# Patient Record
Sex: MALE | Race: WHITE | NOT HISPANIC OR LATINO | Employment: FULL TIME | URBAN - METROPOLITAN AREA
[De-identification: names, ages, dates, MRNs, and addresses within clinical notes are randomized per-mention and may not be internally consistent; named-entity substitution may affect disease eponyms.]

---

## 2017-01-26 ENCOUNTER — TELEPHONE (OUTPATIENT)
Dept: UROLOGY | Facility: CLINIC | Age: 57
End: 2017-01-26

## 2017-01-26 DIAGNOSIS — N45.1 EPIDIDYMITIS: Primary | ICD-10-CM

## 2017-01-26 RX ORDER — CIPROFLOXACIN 500 MG/1
500 TABLET, FILM COATED ORAL 2 TIMES DAILY
Qty: 60 TABLET | Refills: 0 | Status: SHIPPED | OUTPATIENT
Start: 2017-01-26 | End: 2017-03-08

## 2017-03-08 ENCOUNTER — OFFICE VISIT (OUTPATIENT)
Dept: PEDIATRICS | Facility: CLINIC | Age: 57
End: 2017-03-08
Payer: COMMERCIAL

## 2017-03-08 ENCOUNTER — RADIANT APPOINTMENT (OUTPATIENT)
Dept: GENERAL RADIOLOGY | Facility: CLINIC | Age: 57
End: 2017-03-08
Attending: PHYSICIAN ASSISTANT
Payer: COMMERCIAL

## 2017-03-08 VITALS
DIASTOLIC BLOOD PRESSURE: 60 MMHG | HEIGHT: 65 IN | WEIGHT: 149.5 LBS | SYSTOLIC BLOOD PRESSURE: 110 MMHG | HEART RATE: 111 BPM | BODY MASS INDEX: 24.91 KG/M2 | OXYGEN SATURATION: 100 % | TEMPERATURE: 98.9 F

## 2017-03-08 DIAGNOSIS — J20.9 ACUTE BRONCHITIS, UNSPECIFIED ORGANISM: Primary | ICD-10-CM

## 2017-03-08 DIAGNOSIS — R05.9 COUGH: ICD-10-CM

## 2017-03-08 PROCEDURE — 99214 OFFICE O/P EST MOD 30 MIN: CPT | Performed by: PHYSICIAN ASSISTANT

## 2017-03-08 PROCEDURE — 71020 XR CHEST 2 VW: CPT

## 2017-03-08 RX ORDER — AZITHROMYCIN 250 MG/1
TABLET, FILM COATED ORAL
Qty: 6 TABLET | Refills: 0 | Status: SHIPPED | OUTPATIENT
Start: 2017-03-08 | End: 2022-02-16

## 2017-03-08 NOTE — MR AVS SNAPSHOT
After Visit Summary   3/8/2017    Joshua Henderson    MRN: [id number]           Patient Information     Date Of Birth          1960        Visit Information        Provider Department      3/8/2017 9:50 AM Breanna Deleon PA-C [facility]        Today's Diagnoses     Cough    -  1    Acute bronchitis, unspecified organism          Care Instructions                       Acute Bronchitis                  What is acute bronchitis?   Bronchitis is an infection of the air passages--that is, the tubes that connect the windpipe to the lungs. It causes swelling and irritation of the airways. With acute bronchitis you usually have a cough that produces phlegm and pain behind the breastbone when you breathe deeply or cough.   How does it occur?   Bronchitis often occurs with viral infections of the respiratory tract, such as colds and flu. Bronchitis may also be caused by bacterial infections. It may occur with childhood illnesses such as measles and whooping cough.   Attacks are most frequent during the winter or when the level of air pollution is high.   Infants, young children, older adults, smokers, and people with heart disease or lung disease (including asthma and allergies) are most likely to get acute bronchitis.   What are the symptoms?   Symptoms may include:   a deep cough that produces yellowish or greenish phlegm   pain behind the breastbone when you breathe deeply or cough   wheezing   feeling short of breath   fever   chills   headache   sore muscles.   How is it diagnosed?   Your healthcare provider will ask about your symptoms and examine you. You may have tests, such as:   a test of phlegm to look for bacteria   chest X-ray   blood tests.   How is it treated?   Acute bronchitis often does not require medical treatment. Resting at home and drinking plenty of fluids to keep the mucus loose may be all you need to do to get better in a few days. If your symptoms are  severe or you have other health problems (such as heart or lung disease or diabetes), you may need to take antibiotics.   How long will the effects last?   Most of the time acute bronchitis clears up in a few days. Your cough may slowly get better in 1 to 2 weeks.   It may take you longer to recover if:   You are a smoker.   You live in an area where air pollution is a problem.   You have a heart or lung disease.   You have any other continuing health problems.   How can I take care of myself?   You can help yourself by:   following the full treatment your healthcare provider recommends   using a vaporizer, humidifier, or steam from hot water to add moisture to the air   drinking plenty of liquids   taking cough medicine if recommended by your healthcare provider   resting in bed   taking aspirin or acetaminophen to reduce fever and relieve headache and muscle pain (Check with your healthcare provider before you give any medicine that contains aspirin or salicylates to a child or teen. This includes medicines like baby aspirin, some cold medicines, and Pepto Bismol. Children and teens who take aspirin are at risk for a serious illness called Reye's syndrome.)   eating healthy meals.   Call your healthcare provider if:   You have trouble breathing.   You have a fever of 101.5?F (38.6?C) or higher.   You cough up blood.   Your symptoms are getting worse instead of better.   You don't start to feel better after 3 days of treatment.   You have any symptoms that concern you.   How can I help prevent acute bronchitis?   To reduce your risk of getting a respiratory infection:   Do not smoke.   Wash your hands often, especially when you are around people with colds (upper respiratory infections).   If you have asthma or allergies, keep your symptoms under good control.   Get regular exercise.   Eat healthy foods.       Published by [employer].  This content is reviewed periodically and is subject to change as new health  "information becomes available. The information is intended to inform and educate and is not a replacement for medical evaluation, advice, diagnosis or treatment by a healthcare professional.   Developed by [employer].   ? 2010 [employer] [facility] [facility] and/or its affiliates. All Rights Reserved.   Copyright   [employer]                 Follow-ups after your visit        Who to contact     If you have questions or need follow up information about today's clinic visit or your schedule please contact [facility] [place] directly at [phone].  Normal or non-critical lab and imaging results will be communicated to you by MyChart, letter or phone within 4 business days after the clinic has received the results. If you do not hear from us within 7 days, please contact the clinic through [employer] or phone. If you have a critical or abnormal lab result, we will notify you by phone as soon as possible.  Submit refill requests through PaymentOne or call your pharmacy and they will forward the refill request to us. Please allow 3 business days for your refill to be completed.          Additional Information About Your Visit        PaymentOne Information     PaymentOne lets you send messages to your doctor, view your test results, renew your prescriptions, schedule appointments and more. To sign up, go to [email or web address] . Click on \"Log in\" on the left side of the screen, which will take you to the Welcome page. Then click on \"Sign up Now\" on the right side of the page.     You will be asked to enter the access code listed below, as well as some personal information. Please follow the directions to create your username and password.     Your access code is: [email or web address]  Expires: 2017 10:35 AM     Your access code will  in 90 days. If you need help or a new code, please call your [place] clinic or [phone].        Care EveryWhere ID     This is your Care EveryWhere ID. This could be used by other " "organizations to access your [place] medical records  [id number]        Your Vitals Were     Pulse Temperature Height Pulse Oximetry BMI (Body Mass Index)       111 98.9  F (37.2  C) (Oral) 5' 5\" (1.651 m) 100% 24.88 kg/m2        Blood Pressure from Last 3 Encounters:   03/08/17 110/60   09/09/16 128/78   03/03/16 122/70    Weight from Last 3 Encounters:   03/08/17 149 lb 8 oz (67.8 kg)   09/09/16 166 lb 3.2 oz (75.4 kg)   03/03/16 164 lb (74.4 kg)                 Today's Medication Changes          These changes are accurate as of: 3/8/17 10:35 AM.  If you have any questions, ask your nurse or doctor.               Start taking these medicines.        Dose/Directions    azithromycin 250 MG tablet   Commonly known as:  ZITHROMAX   Used for:  Acute bronchitis, unspecified organism   Started by:  Breanna Deleon PA-C        Two tablets first day, then one tablet daily for four days.   Quantity:  6 tablet   Refills:  0         Stop taking these medicines if you haven't already. Please contact your care team if you have questions.     ciprofloxacin 500 MG tablet   Commonly known as:  CIPRO   Stopped by:  Breanna Deleon PA-C           ondansetron 4 MG tablet   Commonly known as:  ZOFRAN   Stopped by:  Breanna Deleon PA-C           PRILOSEC PO   Stopped by:  Breanna Deleon PA-C                Where to get your medicines      These medications were sent to [facility] [id number] - [place], [place] - [address]  [address] [place] [address]     Phone:  [phone]     azithromycin 250 MG tablet                Primary Care Provider Office Phone # Fax #    Paul Leblanc [phone]       [address] [place] [address]        Thank you!     Thank you for choosing [facility]  for your care. Our goal is always to provide you with excellent care. Hearing back from our patients is one way we can " continue to improve our services. Please take a few minutes to complete the written survey that you may receive in the mail after your visit with us. Thank you!             Your Updated Medication List - Protect others around you: Learn how to safely use, store and throw away your medicines at www.disposemymeds.org.          This list is accurate as of: 3/8/17 10:35 AM.  Always use your most recent med list.                   Brand Name Dispense Instructions for use    azithromycin 250 MG tablet    ZITHROMAX    6 tablet    Two tablets first day, then one tablet daily for four days.       calcium carbonate 500 MG tablet    OS-RICHMOND 500 mg [place]. [place]     Take 500 mg by mouth 2 times daily       multivitamin, therapeutic with minerals Tabs tablet      Take 1 tablet by mouth daily       ZANTAC PO      Take 150 mg by mouth 2 times daily

## 2017-03-08 NOTE — PATIENT INSTRUCTIONS
Acute Bronchitis                  What is acute bronchitis?   Bronchitis is an infection of the air passages--that is, the tubes that connect the windpipe to the lungs. It causes swelling and irritation of the airways. With acute bronchitis you usually have a cough that produces phlegm and pain behind the breastbone when you breathe deeply or cough.   How does it occur?   Bronchitis often occurs with viral infections of the respiratory tract, such as colds and flu. Bronchitis may also be caused by bacterial infections. It may occur with childhood illnesses such as measles and whooping cough.   Attacks are most frequent during the winter or when the level of air pollution is high.   Infants, young children, older adults, smokers, and people with heart disease or lung disease (including asthma and allergies) are most likely to get acute bronchitis.   What are the symptoms?   Symptoms may include:   a deep cough that produces yellowish or greenish phlegm   pain behind the breastbone when you breathe deeply or cough   wheezing   feeling short of breath   fever   chills   headache   sore muscles.   How is it diagnosed?   Your healthcare provider will ask about your symptoms and examine you. You may have tests, such as:   a test of phlegm to look for bacteria   chest X-ray   blood tests.   How is it treated?   Acute bronchitis often does not require medical treatment. Resting at home and drinking plenty of fluids to keep the mucus loose may be all you need to do to get better in a few days. If your symptoms are severe or you have other health problems (such as heart or lung disease or diabetes), you may need to take antibiotics.   How long will the effects last?   Most of the time acute bronchitis clears up in a few days. Your cough may slowly get better in 1 to 2 weeks.   It may take you longer to recover if:   You are a smoker.   You live in an area where air pollution is a problem.   You have a heart  or lung disease.   You have any other continuing health problems.   How can I take care of myself?   You can help yourself by:   following the full treatment your healthcare provider recommends   using a vaporizer, humidifier, or steam from hot water to add moisture to the air   drinking plenty of liquids   taking cough medicine if recommended by your healthcare provider   resting in bed   taking aspirin or acetaminophen to reduce fever and relieve headache and muscle pain (Check with your healthcare provider before you give any medicine that contains aspirin or salicylates to a child or teen. This includes medicines like baby aspirin, some cold medicines, and Pepto Bismol. Children and teens who take aspirin are at risk for a serious illness called Reye's syndrome.)   eating healthy meals.   Call your healthcare provider if:   You have trouble breathing.   You have a fever of 101.5?F (38.6?C) or higher.   You cough up blood.   Your symptoms are getting worse instead of better.   You don't start to feel better after 3 days of treatment.   You have any symptoms that concern you.   How can I help prevent acute bronchitis?   To reduce your risk of getting a respiratory infection:   Do not smoke.   Wash your hands often, especially when you are around people with colds (upper respiratory infections).   If you have asthma or allergies, keep your symptoms under good control.   Get regular exercise.   Eat healthy foods.       Published by [employer].  This content is reviewed periodically and is subject to change as new health information becomes available. The information is intended to inform and educate and is not a replacement for medical evaluation, advice, diagnosis or treatment by a healthcare professional.   Developed by [employer].   ? 2010 [employer] and/or its affiliates. All Rights Reserved.   Copyright   Clinical Reference Systems 2011

## 2017-03-08 NOTE — PROGRESS NOTES
"  SUBJECTIVE:                                                    Joshua Henderson is a 56 year old male who presents to clinic today for the following health issues:    Acute Illness   Acute illness concerns: cold sx  Onset: 5 days ago    Fever: YES- 99.5--initially    Chills/Sweats: no    Headache (location?): YES- the first day    Sinus Pressure:no    Conjunctivitis:  no    Ear Pain: no    Rhinorrhea: YES    Congestion: YES    Sore Throat: no     Cough: yes, coughing up yellow phlegm    Wheeze: no    No sob    Able to sleep at night    Decreased Appetite: no    Nausea: no    Vomiting: no    Diarrhea:  no    Dysuria/Freq.: no    Fatigue/Achiness: YES- fatigue    Sick/Strep Exposure: no     Therapies Tried and outcome: mucinex, tylenol    Office setting for work.   Nonsmoker.   No history of asthma, bronchitis. History of pneumonia.    ROS:  ROS otherwise negative    OBJECTIVE:                                                    /60 (BP Location: Right arm, Patient Position: Chair, Cuff Size: Adult Regular)  Pulse 111  Temp 98.9  F (37.2  C) (Oral)  Ht 5' 5\" (1.651 m)  Wt 149 lb 8 oz (67.8 kg)  SpO2 100%  BMI 24.88 kg/m2  Body mass index is 24.88 kg/(m^2).   GENERAL:  alert, no distress  HENT: ear canals- normal; TMs- normal; Nose- normal; Mouth- no ulcers, no lesions  NECK: no tenderness, no adenopathy  RESP: diminished breath sounds throughout; rhonchi present; no wheezing  CV: regular rates and rhythm, normal S1 S2, no S3 or S4 and no murmur, no click or rub    Diagnostic test results:  CXR appears NEGATIVE. Radiology review pending.  No results found for this or any previous visit (from the past 24 hour(s)).     ASSESSMENT/PLAN:                                                    (J20.9) Acute bronchitis, unspecified organism  (primary encounter diagnosis)  Comment: patient likely had influenza. Symptoms worsened last night. Proceed with treatment for bacterial bronchitis.   Plan: XR Chest 2 Views, " azithromycin (ZITHROMAX) 250         MG tablet          See Patient Instructions    Breanna Deleon PA-C  [facility]

## 2017-03-08 NOTE — NURSING NOTE
"Chief Complaint   Patient presents with     URI       Initial /60 (BP Location: Right arm, Patient Position: Chair, Cuff Size: Adult Regular)  Pulse 111  Temp 98.9  F (37.2  C) (Oral)  Ht 5' 5\" (1.651 m)  Wt 149 lb 8 oz (67.8 kg)  SpO2 100%  BMI 24.88 kg/m2 Estimated body mass index is 24.88 kg/(m^2) as calculated from the following:    Height as of this encounter: 5' 5\" (1.651 m).    Weight as of this encounter: 149 lb 8 oz (67.8 kg).  Medication Reconciliation: complete  "

## 2017-11-26 ENCOUNTER — TRANSFERRED RECORDS (OUTPATIENT)
Dept: HEALTH INFORMATION MANAGEMENT | Facility: CLINIC | Age: 57
End: 2017-11-26

## 2020-03-16 DIAGNOSIS — N45.1 EPIDIDYMITIS: Primary | ICD-10-CM

## 2020-03-17 ENCOUNTER — OFFICE VISIT (OUTPATIENT)
Dept: UROLOGY | Facility: CLINIC | Age: 60
End: 2020-03-17
Payer: COMMERCIAL

## 2020-03-17 VITALS
DIASTOLIC BLOOD PRESSURE: 88 MMHG | HEART RATE: 95 BPM | WEIGHT: 157 LBS | SYSTOLIC BLOOD PRESSURE: 142 MMHG | BODY MASS INDEX: 25.23 KG/M2 | HEIGHT: 66 IN | OXYGEN SATURATION: 97 %

## 2020-03-17 DIAGNOSIS — N45.1 EPIDIDYMITIS: ICD-10-CM

## 2020-03-17 LAB
ALBUMIN UR-MCNC: NEGATIVE MG/DL
APPEARANCE UR: CLEAR
BILIRUB UR QL STRIP: NEGATIVE
COLOR UR AUTO: YELLOW
GLUCOSE UR STRIP-MCNC: NEGATIVE MG/DL
HGB UR QL STRIP: ABNORMAL
KETONES UR STRIP-MCNC: NEGATIVE MG/DL
LEUKOCYTE ESTERASE UR QL STRIP: NEGATIVE
NITRATE UR QL: NEGATIVE
PH UR STRIP: 7 PH (ref 5–7)
SOURCE: ABNORMAL
SP GR UR STRIP: >1.03 (ref 1–1.03)
UROBILINOGEN UR STRIP-ACNC: 0.2 EU/DL (ref 0.2–1)

## 2020-03-17 PROCEDURE — 81003 URINALYSIS AUTO W/O SCOPE: CPT | Performed by: UROLOGY

## 2020-03-17 PROCEDURE — 99204 OFFICE O/P NEW MOD 45 MIN: CPT | Performed by: UROLOGY

## 2020-03-17 RX ORDER — DOXYCYCLINE 100 MG/1
100 TABLET ORAL 2 TIMES DAILY
Qty: 84 TABLET | Refills: 0 | Status: SHIPPED | OUTPATIENT
Start: 2020-03-17 | End: 2020-04-28

## 2020-03-17 ASSESSMENT — MIFFLIN-ST. JEOR: SCORE: 1469.9

## 2020-03-17 ASSESSMENT — PAIN SCALES - GENERAL: PAINLEVEL: MILD PAIN (3)

## 2020-03-17 NOTE — PROGRESS NOTES
History: It is a great pleasure to see this very pleasant 59-year-old gentleman in initial consultation today.  He has a long history of intermittent episodes of epididymitis currently he is noticing renewed pain on the right side with some mild swelling but there is no history of fevers or other significant major symptoms.  The urinary symptoms seem normal without evidence of dysuria, hematuria or history of urinary tract infection.  The pain does not seem to radiate into the abdomen significantly.  His general health is otherwise stable.  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  Other conditions include a history of scoliosis forward  There were no other major complaints at this time    Past Medical History:   Diagnosis Date     Annular pancreas      Congenital aortic stenosis     mild     Epididymitis, bilateral      Scoliosis     kyphosis, prior bracing        Past Surgical History:   Procedure Laterality Date     CARDIAC CATH ARTERIAL      age 8, dx bicuspid aortic valve     SURGICAL HISTORY OF -       excision annular pancreas lesion age 3days       Family History   Problem Relation Age of Onset     Cancer Father          55 of metastatic melanoma     Hypertension Father      Cardiovascular Mother         bradycardia, pacemaker     Hypertension Mother      Diabetes Brother      Cancer - colorectal No family hx of      Prostate Cancer No family hx of        Social History     Socioeconomic History     Marital status:      Spouse name: Not on file     Number of children: Not on file     Years of education: Not on file     Highest education level: Not on file   Occupational History     Not on file   Social Needs     Financial resource strain: Not on file     Food insecurity     Worry: Not on file     Inability: Not on file      Transportation needs     Medical: Not on file     Non-medical: Not on file   Tobacco Use     Smoking status: Never Smoker     Smokeless tobacco: Never Used   Substance and Sexual Activity     Alcohol use: No     Drug use: No     Sexual activity: Yes     Partners: Female   Lifestyle     Physical activity     Days per week: Not on file     Minutes per session: Not on file     Stress: Not on file   Relationships     Social connections     Talks on phone: Not on file     Gets together: Not on file     Attends Scientologist service: Not on file     Active member of club or organization: Not on file     Attends meetings of clubs or organizations: Not on file     Relationship status: Not on file     Intimate partner violence     Fear of current or ex partner: Not on file     Emotionally abused: Not on file     Physically abused: Not on file     Forced sexual activity: Not on file   Other Topics Concern     Parent/sibling w/ CABG, MI or angioplasty before 65F 55M? Not Asked   Social History Narrative     Not on file       Current Outpatient Medications   Medication Sig Dispense Refill     calcium carbonate (OS-RICHMOND 500 MG [place]. [place]) 500 MG tablet Take 500 mg by mouth 2 times daily       doxycycline monohydrate (ADOXA) 100 MG tablet Take 1 tablet (100 mg) by mouth 2 times daily 84 tablet 0     multivitamin, therapeutic with minerals (THERA-VIT-M) TABS Take 1 tablet by mouth daily       azithromycin (ZITHROMAX) 250 MG tablet Two tablets first day, then one tablet daily for four days. (Patient not taking: Reported on 3/17/2020) 6 tablet 0     RaNITidine HCl (ZANTAC PO) Take 150 mg by mouth 2 times daily         Review Of Systems:  Skin: negative  Eyes: negative  Ears/Nose/Throat: negative  Respiratory: Normal  Cardiovascular: History of congenital aortic stenosis  Gastrointestinal: negative  Genitourinary: History of annular pancreas   Musculoskeletal: Scoliosis  Neurologic: negative  Psychiatric:  "negative  Hematologic/Lymphatic/Immunologic: negative  Endocrine: negative    Exam:  BP (!) 142/88   Pulse 95   Ht 1.676 m (5' 6\")   Wt 71.2 kg (157 lb)   SpO2 97%   BMI 25.34 kg/m      General Impression: Very pleasant gentleman in no acute distress, well oriented to time place and person.    Mental status.  Normal    HEENT: There is no clinical evidence of jaundice on examination conjunctiva.  Extraocular eye movements are normal.  Mucous membranes unremarkable    Skin: Skin is normal to examination    Lymph Nodes: There is no inguinal lymphadenopathy    Respiratory System: The respiratory cycle is normal    Cardiovascular: No significant pitting peripheral edema    Abdominal: The abdomen shows a well-healed surgical scar on the right side of the abdomen.  There is no evidence of inguinal hernia.  There are no palpable masses    Extremities: Extremities otherwise unremarkable    Back and Flank: There is some curvature of the spine consistent with scoliosis but no other remarkable features    Genital: Penis is circumcised with normal size meatus.  Both testes are descended.  The epididymis on both side is significantly tender and swollen although not acutely inflamed.  The testicle itself is of normal size on each side and only mildly tender.      Rectal: Not examined    Neurologic: There are no focal abnormal clinical neurological signs in the central, or peripheral nervous systems    Impression: The patient has evidence of bilateral chronic epididymitis with tenderness of both epididymis knees although many of his symptoms are at the present time of being on the right side.  He had previously been treated with ciprofloxacin in the past sometime ago.  Urinalysis today is quite negative.  I discussed the situation with the patient in detail today.  He does have mild to moderate chronic epididymitis with a significant recent exacerbation and now has both epididymis is quite tender.  The swelling however is only " "moderate.  I would recommend we approach this in the following way.  1.  I want him to eliminate caffeine for the time being to see how helpful this would be.  2.  He should avoid cranberry juice.  3.  He should try and avoid really spicy food if this is causes significant problems in this area.  4.  I like him to wear good supporting underpants rather than boxer shorts.  5.  He should consider taking warm baths in the evening which may be quite helpful.  6.  He should take anti-inflammatory medication as needed.  7.  I like to put him on doxycycline under the grams twice a day for the next 6 weeks.  I have explained to him that this is a situation where relapses and remissions are not unusual I am hoping that the current measures will help this considerably to alleviate.  I have asked him to come and see me again should this does not solve the problem however.  I did carefully discussed the entire situation with the patient in detail today.  Answered all his questions    Plan: I will see him on a as needed basis and immediately if symptoms are not resolving in a timely manner    \"This dictation was performed with voice recognition software and may contain errors,  omissions and inadvertent word substitution.\"    "

## 2020-03-17 NOTE — LETTER
3/17/2020       RE: Joshua Henderson   [facility] [address]     Dear Colleague,    Thank you for referring your patient, Joshua Henderson, to the [facility] [place] at [facility]. Please see a copy of my visit note below.    History: It is a great pleasure to see this very pleasant 59-year-old gentleman in initial consultation today.  He has a long history of intermittent episodes of epididymitis currently he is noticing renewed pain on the right side with some mild swelling but there is no history of fevers or other significant major symptoms.  The urinary symptoms seem normal without evidence of dysuria, hematuria or history of urinary tract infection.  The pain does not seem to radiate into the abdomen significantly.  His general health is otherwise stable.  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  Other conditions include a history of scoliosis forward  There were no other major complaints at this time    Past Medical History:   Diagnosis Date     Annular pancreas      Congenital aortic stenosis     mild     Epididymitis, bilateral      Scoliosis     kyphosis, prior bracing        Past Surgical History:   Procedure Laterality Date     CARDIAC CATH ARTERIAL      age 8, dx bicuspid aortic valve     SURGICAL HISTORY OF -       excision annular pancreas lesion age 3days       Family History   Problem Relation Age of Onset     Cancer Father          55 of metastatic melanoma     Hypertension Father      Cardiovascular Mother         bradycardia, pacemaker     Hypertension Mother      Diabetes Brother      Cancer - colorectal No family hx of      Prostate Cancer No family hx of        Social History     Socioeconomic History     Marital status:       Spouse name: Not on file     Number of children: Not on file     Years of education: Not on file     Highest education level: Not on file   Occupational History     Not on file   Social Needs     Financial resource strain: Not on file     Food insecurity     Worry: Not on file     Inability: Not on file     Transportation needs     Medical: Not on file     Non-medical: Not on file   Tobacco Use     Smoking status: Never Smoker     Smokeless tobacco: Never Used   Substance and Sexual Activity     Alcohol use: No     Drug use: No     Sexual activity: Yes     Partners: Female   Lifestyle     Physical activity     Days per week: Not on file     Minutes per session: Not on file     Stress: Not on file   Relationships     Social connections     Talks on phone: Not on file     Gets together: Not on file     Attends Mu-ism service: Not on file     Active member of club or organization: Not on file     Attends meetings of clubs or organizations: Not on file     Relationship status: Not on file     Intimate partner violence     Fear of current or ex partner: Not on file     Emotionally abused: Not on file     Physically abused: Not on file     Forced sexual activity: Not on file   Other Topics Concern     Parent/sibling w/ CABG, MI or angioplasty before 65F 55M? Not Asked   Social History Narrative     Not on file       Current Outpatient Medications   Medication Sig Dispense Refill     calcium carbonate (OS-RICHMOND 500 MG [place]. CA) 500 MG tablet Take 500 mg by mouth 2 times daily       doxycycline monohydrate (ADOXA) 100 MG tablet Take 1 tablet (100 mg) by mouth 2 times daily 84 tablet 0     multivitamin, therapeutic with minerals (THERA-VIT-M) TABS Take 1 tablet by mouth daily       azithromycin (ZITHROMAX) 250 MG tablet Two tablets first day, then one tablet daily for four days. (Patient not taking: Reported on 3/17/2020) 6 tablet 0     RaNITidine HCl (ZANTAC PO) Take 150 mg by mouth 2 times daily         Review  "Of Systems:  Skin: negative  Eyes: negative  Ears/Nose/Throat: negative  Respiratory: Normal  Cardiovascular: History of congenital aortic stenosis  Gastrointestinal: negative  Genitourinary: History of annular pancreas   Musculoskeletal: Scoliosis  Neurologic: negative  Psychiatric: negative  Hematologic/Lymphatic/Immunologic: negative  Endocrine: negative    Exam:  BP (!) 142/88   Pulse 95   Ht 1.676 m (5' 6\")   Wt 71.2 kg (157 lb)   SpO2 97%   BMI 25.34 kg/m      General Impression: Very pleasant gentleman in no acute distress, well oriented to time place and person.    Mental status.  Normal    HEENT: There is no clinical evidence of jaundice on examination conjunctiva.  Extraocular eye movements are normal.  Mucous membranes unremarkable    Skin: Skin is normal to examination    Lymph Nodes: There is no inguinal lymphadenopathy    Respiratory System: The respiratory cycle is normal    Cardiovascular: No significant pitting peripheral edema    Abdominal: The abdomen shows a well-healed surgical scar on the right side of the abdomen.  There is no evidence of inguinal hernia.  There are no palpable masses    Extremities: Extremities otherwise unremarkable    Back and Flank: There is some curvature of the spine consistent with scoliosis but no other remarkable features    Genital: Penis is circumcised with normal size meatus.  Both testes are descended.  The epididymis on both side is significantly tender and swollen although not acutely inflamed.  The testicle itself is of normal size on each side and only mildly tender.      Rectal: Not examined    Neurologic: There are no focal abnormal clinical neurological signs in the central, or peripheral nervous systems    Impression: The patient has evidence of bilateral chronic epididymitis with tenderness of both epididymis knees although many of his symptoms are at the present time of being on the right side.  He had previously been treated with ciprofloxacin in " "the past sometime ago.  Urinalysis today is quite negative.  I discussed the situation with the patient in detail today.  He does have mild to moderate chronic epididymitis with a significant recent exacerbation and now has both epididymis is quite tender.  The swelling however is only moderate.  I would recommend we approach this in the following way.  1.  I want him to eliminate caffeine for the time being to see how helpful this would be.  2.  He should avoid cranberry juice.  3.  He should try and avoid really spicy food if this is causes significant problems in this area.  4.  I like him to wear good supporting underpants rather than boxer shorts.  5.  He should consider taking warm baths in the evening which may be quite helpful.  6.  He should take anti-inflammatory medication as needed.  7.  I like to put him on doxycycline under the grams twice a day for the next 6 weeks.  I have explained to him that this is a situation where relapses and remissions are not unusual I am hoping that the current measures will help this considerably to alleviate.  I have asked him to come and see me again should this does not solve the problem however.  I did carefully discussed the entire situation with the patient in detail today.  Answered all his questions    Plan: I will see him on a as needed basis and immediately if symptoms are not resolving in a timely manner    \"This dictation was performed with voice recognition software and may contain errors,  omissions and inadvertent word substitution.\"      Again, thank you for allowing me to participate in the care of your patient.      Sincerely,    Chin Everett MD      "

## 2020-03-17 NOTE — NURSING NOTE
Chief Complaint   Patient presents with     Other     Pt here for epididymitis     Sandra Byrd, CMA

## 2020-12-23 ENCOUNTER — TELEPHONE (OUTPATIENT)
Dept: UROLOGY | Facility: CLINIC | Age: 60
End: 2020-12-23

## 2020-12-23 DIAGNOSIS — N45.1 EPIDIDYMITIS: Primary | ICD-10-CM

## 2020-12-23 RX ORDER — DOXYCYCLINE 100 MG/1
100 TABLET ORAL 2 TIMES DAILY
Qty: 60 TABLET | Refills: 0 | Status: SHIPPED | OUTPATIENT
Start: 2020-12-23 | End: 2021-01-22

## 2020-12-23 NOTE — TELEPHONE ENCOUNTER
[facility] Call Center    Phone Message    May a detailed message be left on voicemail: yes     Reason for Call: Medication Refill Request    Has the patient contacted the pharmacy for the refill? Yes   Name of medication being requested: doxycycline monohydrate (ADOXA) 100 MG tablet  Pt Joshua reports flare-up of epididymitis.  Provider who prescribed the medication: Dr Everett  Pharmacy: [id number] [address] [place] [facility] [address]   Date medication is needed: 12/23/2020      Action Taken: Message routed to:  Other:  urology    Travel Screening: Not Applicable

## 2020-12-23 NOTE — TELEPHONE ENCOUNTER
[facility] Call [facility]    Phone Message    May a detailed message be left on voicemail: yes     Reason for Call: Other: Merritt calling to report that the pharmacy has not received the Rx as of yet.  He was informed that the pharmacy confirmed reciept of it at 10:16am.  He is requesting that the prescription be resent.  Please call him with any questions or concern.     Action Taken: Message routed to:  [facility]):  Urology    Travel Screening: Not Applicable

## 2020-12-23 NOTE — TELEPHONE ENCOUNTER
"Per MD-\"Suggest we start doxycycline 100 mg twice a day for 4 weeks.\"  RX Called into patient's preferred pharmacy. Informed patient.     Nadine Milligan LPN    "

## 2020-12-24 NOTE — TELEPHONE ENCOUNTER
[facility] Call [facility]    Phone Message    May a detailed message be left on voicemail: no     Reason for Call: Other: Pt calling again about status of resending script to pharmacy. Pt would like someone to call the pharmacy to confirm receipt of delivery.     Action Taken: Message routed to:  Other:  CLINICAL POOL    Travel Screening: Not Applicable

## 2021-03-11 ENCOUNTER — IMMUNIZATION (OUTPATIENT)
Dept: PEDIATRICS | Facility: CLINIC | Age: 61
End: 2021-03-11
Payer: COMMERCIAL

## 2021-03-11 PROCEDURE — 91300 PR COVID VAC PFIZER DIL RECON 30 MCG/0.3 ML IM: CPT

## 2021-03-11 PROCEDURE — 0001A PR COVID VAC PFIZER DIL RECON 30 MCG/0.3 ML IM: CPT

## 2021-03-14 ENCOUNTER — HEALTH MAINTENANCE LETTER (OUTPATIENT)
Age: 61
End: 2021-03-14

## 2021-04-01 ENCOUNTER — IMMUNIZATION (OUTPATIENT)
Dept: PEDIATRICS | Facility: CLINIC | Age: 61
End: 2021-04-01
Attending: INTERNAL MEDICINE
Payer: COMMERCIAL

## 2021-04-01 PROCEDURE — 0002A PR COVID VAC PFIZER DIL RECON 30 MCG/0.3 ML IM: CPT

## 2021-04-01 PROCEDURE — 91300 PR COVID VAC PFIZER DIL RECON 30 MCG/0.3 ML IM: CPT

## 2021-10-24 ENCOUNTER — HEALTH MAINTENANCE LETTER (OUTPATIENT)
Age: 61
End: 2021-10-24

## 2022-02-16 ENCOUNTER — OFFICE VISIT (OUTPATIENT)
Dept: CARDIOLOGY | Facility: CLINIC | Age: 62
End: 2022-02-16
Payer: COMMERCIAL

## 2022-02-16 VITALS
SYSTOLIC BLOOD PRESSURE: 144 MMHG | DIASTOLIC BLOOD PRESSURE: 79 MMHG | WEIGHT: 162 LBS | HEIGHT: 66 IN | BODY MASS INDEX: 26.03 KG/M2 | HEART RATE: 83 BPM

## 2022-02-16 DIAGNOSIS — Q23.81 BICUSPID AORTIC VALVE: ICD-10-CM

## 2022-02-16 DIAGNOSIS — Q23.0 CONGENITAL AORTIC STENOSIS: Primary | ICD-10-CM

## 2022-02-16 PROCEDURE — 99204 OFFICE O/P NEW MOD 45 MIN: CPT | Mod: 25 | Performed by: INTERNAL MEDICINE

## 2022-02-16 PROCEDURE — 93000 ELECTROCARDIOGRAM COMPLETE: CPT | Performed by: INTERNAL MEDICINE

## 2022-02-16 RX ORDER — FAMOTIDINE 20 MG/1
20 TABLET, FILM COATED ORAL 2 TIMES DAILY PRN
COMMUNITY

## 2022-02-16 NOTE — PROGRESS NOTES
Service Date: 02/16/2022    HISTORY OF PRESENT ILLNESS:  Joshua Henderson is a 61-year-old gentleman.  I last saw him more than 2 decades ago.  His mother, Luz, was my patient for years.  She has since passed away.      Joshua has a bicuspid aortic valve identified in 1996.  He had a followup echo in 2002, it was not particularly stenotic.  Clinically, he does not report much in the way of any symptoms referable to that.  He does note a little bit of shortness of breath when he goes up a couple flights of stairs, but he states it is really the same as it has been for quite some time.  He has no lightheadedness, no dizziness, no syncope, no undue shortness of breath.  No fluid retention.  No chest pain.      Today, we reviewed the 1996 and the 2002 echo and again they both showed a probable bicuspid aortic valve with trivial stenosis.  A stress test from many years ago was negative for ischemia.  We reviewed the patient's past social history, medication list.      His blood pressure is upper normal.  I am going to have him come in for an echocardiogram since it has been 2 decades since the last one.  We will get an idea what his blood pressure is at the time of the echo and get an idea what his blood pressure is at the next office visit, and if it is normal, we do not need to act, although there was a blood pressure in the chart from Epic in 2020 that was also elevated, so if the blood pressure is elevated, I would have a low threshold to start him on something like lisinopril, but we will wait until next week when he gets his echo for followup.      He does not have any new labs from [place].  He probably should have electrolytes and lipids at some point.  Certainly, if we start lisinopril, we are going to get electrolytes afterwards.  Through [facility], I actually do not see any labs since 2016, so again depending on what we see at the followup visit after the echo, we might get an electrolyte panel and a  lipid panel.    Today's visit was 27 minutes.    cc:   Paul Leblanc MD   [address] [address]     Rohan Roe MD        D: 2022   T: 2022   MT: [id number]    Name:     ELIZABETH CALVERT  MRN:      [id number]        Account:      [id number]   :      1960           Service Date: 2022       Document: [id number]

## 2022-02-16 NOTE — LETTER
2022    Paul Leblanc MD  [address] [place] [address]    RE: Joshua Henderson       Dear Colleague,     I had the pleasure of seeing Joshua Henderson in the [facility].  HPI and Plan:   See dictation    Orders Placed This Encounter   Procedures     Follow-Up with Cardiology NOHEMY     EKG 12-lead complete w/read - Clinics (performed today)     Echocardiogram Complete     Orders Placed This Encounter   Medications     famotidine (PEPCID) 20 MG tablet     Sig: Take 20 mg by mouth 2 times daily as needed     Medications Discontinued During This Encounter   Medication Reason     RaNITidine HCl (ZANTAC PO) Medication Reconciliation Clean Up     azithromycin (ZITHROMAX) 250 MG tablet Therapy completed         Encounter Diagnoses   Name Primary?     Congenital aortic stenosis Yes     Bicuspid aortic valve        CURRENT MEDICATIONS:  Current Outpatient Medications   Medication Sig Dispense Refill     calcium carbonate (OS-RICHMOND 500 MG [place]. [place]) 500 MG tablet Take 500 mg by mouth 2 times daily       famotidine (PEPCID) 20 MG tablet Take 20 mg by mouth 2 times daily as needed       multivitamin, therapeutic with minerals (THERA-VIT-M) TABS Take 1 tablet by mouth daily         ALLERGIES     Allergies   Allergen Reactions     Sulfa Drugs      Duricef [Cefadroxil Monohydrate] Rash     Rash         PAST MEDICAL HISTORY:  Past Medical History:   Diagnosis Date     Annular pancreas      Congenital aortic stenosis     mild  bicuspid aortic valve     Epididymitis, bilateral      Gastroesophageal reflux disease without esophagitis     rare     Scoliosis     kyphosis, prior bracing        PAST SURGICAL HISTORY:  Past Surgical History:   Procedure Laterality Date     CARDIAC CATH ARTERIAL      age 8, dx bicuspid aortic valve     SURGICAL HISTORY OF -       excision annular pancreas lesion age 3days       FAMILY HISTORY:  Family History   Problem Relation Age of Onset     Cancer Father           "55 of metastatic melanoma     Hypertension Father      Cardiovascular Mother         bradycardia, pacemaker     Hypertension Mother      Diabetes Brother      Cancer - colorectal No family hx of      Prostate Cancer No family hx of        SOCIAL HISTORY:  Social History     Socioeconomic History     Marital status:      Spouse name: Not on file     Number of children: Not on file     Years of education: Not on file     Highest education level: Not on file   Occupational History     Not on file   Tobacco Use     Smoking status: Never Smoker     Smokeless tobacco: Never Used   Substance and Sexual Activity     Alcohol use: No     Drug use: No     Comment: caffeine 1/d     Sexual activity: Yes     Partners: Female   Other Topics Concern     Parent/sibling w/ CABG, MI or angioplasty before 65F 55M? Not Asked   Social History Narrative     Not on file     Social Determinants of Health     Financial Resource Strain: Not on file   Food Insecurity: Not on file   Transportation Needs: Not on file   Physical Activity: Not on file   Stress: Not on file   Social Connections: Not on file   Intimate Partner Violence: Not on file   Housing Stability: Not on file       Review of Systems:  Skin:        Eyes:       ENT:       Respiratory:       Cardiovascular:       Gastroenterology:      Genitourinary:       Musculoskeletal:       Neurologic:       Psychiatric:       Heme/Lymph/Imm:       Endocrine:         Physical Exam:  Vitals: BP (!) 144/79   Pulse 83   Ht 1.676 m (5' 6\")   Wt 73.5 kg (162 lb)   BMI 26.15 kg/m      Constitutional:  cooperative, alert and oriented, well developed, well nourished, in no acute distress        Skin:  warm and dry to the touch, no apparent skin lesions or masses noted          Head:  normocephalic, no masses or lesions        Eyes:  pupils equal and round, conjunctivae and lids unremarkable, sclera white, no xanthalasma, EOMS intact, no nystagmus        Lymph:No Cervical lymphadenopathy " present;No thyromegaly     ENT:           Neck:  carotid pulses are full and equal bilaterally, JVP normal, no carotid bruit transmitted murmur minimal    Respiratory:  normal breath sounds, clear to auscultation, normal A-P diameter, normal symmetry, normal respiratory excursion, no use of accessory muscles         Cardiac: regular rhythm       systolic ejection murmur;early systolic murmur;grade 1     tambour quality s2  pulses full and equal, no bruits auscultated                                        GI:  abdomen soft, non-tender, BS normoactive, no mass, no HSM, no bruits        Extremities and Muscular Skeletal:  no deformities, clubbing, cyanosis, erythema observed;no edema              Neurological:  no gross motor deficits        Psych:  Alert and Oriented x 3      Recent Lab Results:  LIPID RESULTS:  Lab Results   Component Value Date    CHOL 151 07/17/2012    HDL 34 (L) 07/17/2012    LDL 89 07/17/2012    TRIG 144 07/17/2012    CHOLHDLRATIO 4.5 07/17/2012       LIVER ENZYME RESULTS:  Lab Results   Component Value Date    AST 23 07/17/2012    ALT <6 07/17/2012       CBC RESULTS:  No results found for: WBC, RBC, HGB, HCT, MCV, MCH, MCHC, RDW, PLT    BMP RESULTS:  Lab Results   Component Value Date     07/17/2012    POTASSIUM 3.9 07/17/2012    CHLORIDE 105 07/17/2012    CO2 27 07/17/2012    ANIONGAP 11 07/17/2012    GLC 91 07/17/2012    BUN 11 07/17/2012    CR 0.84 07/17/2012    GFRESTIMATED >90 07/17/2012    GFRESTBLACK >90 07/17/2012    RICHMOND 9.2 07/17/2012        A1C RESULTS:  No results found for: A1C    INR RESULTS:  No results found for: INR        CC  No referring provider defined for this encounter.    Thank you for allowing me to participate in the care of your patient.      Sincerely,     Rohan Roe MD     [facility] [place]

## 2022-03-08 ENCOUNTER — HOSPITAL ENCOUNTER (OUTPATIENT)
Dept: CARDIOLOGY | Facility: CLINIC | Age: 62
Discharge: HOME | End: 2022-03-08
Attending: INTERNAL MEDICINE | Admitting: INTERNAL MEDICINE
Payer: COMMERCIAL

## 2022-03-08 DIAGNOSIS — Q23.81 BICUSPID AORTIC VALVE: ICD-10-CM

## 2022-03-08 LAB — LVEF ECHO: NORMAL

## 2022-03-08 PROCEDURE — 93306 TTE W/DOPPLER COMPLETE: CPT | Mod: 26 | Performed by: INTERNAL MEDICINE

## 2022-03-08 PROCEDURE — 93306 TTE W/DOPPLER COMPLETE: CPT

## 2022-03-13 NOTE — PROGRESS NOTES
HISTORY OF PRESENT ILLNESS:    This is a 61 year old male who follows with Dr Roe at [facility] for congenital aortic stenosis.      Mr Henderson's echocardiogram in 1996 showed concern for bicuspid aortic.  A follow up ECHO (2002) showed preserved LVEF and likely bicuspid aortic valve with trivial aortic stenosis.  Overall, he has been free from any significant cardiovascular complaints  His blood pressure was borderline elevated when seen in clinic last month    Our visit today is for further review and surveillance ECHO    Mr Henderson overall feels well without any significant cardiovascular complaints  He admits to being fairly sedentary during the COVID isolation and will start walking outside now that it is getting warm  He denies any chest pain, shortness of breath, palpitations, orthopnea, or peripheral edema.  He is surprised that his blood pressure is elevated and we reviewed his recent blood pressure recordings.      I reviewed his ECHO (3/8/22)  This showed LVEF 55-60% without regional wall motion abnormalities, normal RV function, sclerotic aortic valve (cannot exclude bicuspid aortic valve) Ascending aorta 4.2 cm  BP: 168/82      VITAL SIGNS:  BP: 148/82  Pulse: 96  Weight: 165 lbs (BMI: 26)          IMPRESSION AND PLAN:    Possible Bicuspid Aortic Valve  -sclerosis without significant stenosis  -mean gradient 11 mmHg, MIKAEL 1.8 cm2    Hypertension:  -BP elevated  -will add Lisinopril 5 mg/day  -return in 1 month with BMP    Mild Ascending Aortic Dilatation  -4.2 cm  -repeat ECHO in 2 yrs    The total time for the visit today was 30 minutes which includes patient visit, reviewing of records, discussion, and placing of orders of the outpatient coordination of cardiovascular care as described.  The level of medical decision making during this visit was of moderate complexity.  Thank you for allowing me to participate in their care.    Orders Placed This Encounter   Procedures     Basic  metabolic panel     Follow-Up with Cardiology       Orders Placed This Encounter   Medications     lisinopril (ZESTRIL) 5 MG tablet     Sig: Take 1 tablet (5 mg) by mouth daily     Dispense:  30 tablet     Refill:  3       There are no discontinued medications.      Encounter Diagnoses   Name Primary?     Bicuspid aortic valve      Benign essential hypertension Yes       CURRENT MEDICATIONS:  Current Outpatient Medications   Medication Sig Dispense Refill     calcium carbonate (OS-RICHMOND 500 MG [place]. [place]) 500 MG tablet Take 500 mg by mouth 2 times daily       famotidine (PEPCID) 20 MG tablet Take 20 mg by mouth 2 times daily as needed       lisinopril (ZESTRIL) 5 MG tablet Take 1 tablet (5 mg) by mouth daily 30 tablet 3     multivitamin, therapeutic with minerals (THERA-VIT-M) TABS Take 1 tablet by mouth daily         ALLERGIES     Allergies   Allergen Reactions     Sulfa Drugs      Duricef [Cefadroxil Monohydrate] Rash     Rash         PAST MEDICAL HISTORY:  Past Medical History:   Diagnosis Date     Annular pancreas      Congenital aortic stenosis     mild  bicuspid aortic valve     Epididymitis, bilateral      Gastroesophageal reflux disease without esophagitis     rare     Scoliosis     kyphosis, prior bracing        PAST SURGICAL HISTORY:  Past Surgical History:   Procedure Laterality Date     CARDIAC CATH ARTERIAL      age 8, dx bicuspid aortic valve     SURGICAL HISTORY OF -       excision annular pancreas lesion age 3days       FAMILY HISTORY:  Family History   Problem Relation Age of Onset     Cancer Father          55 of metastatic melanoma     Hypertension Father      Cardiovascular Mother         bradycardia, pacemaker     Hypertension Mother      Diabetes Brother      Cancer - colorectal No family hx of      Prostate Cancer No family hx of        SOCIAL HISTORY:  Social History     Socioeconomic History     Marital status:      Spouse name: None     Number of children: None     Years of  "education: None     Highest education level: None   Occupational History     None   Tobacco Use     Smoking status: Never Smoker     Smokeless tobacco: Never Used   Substance and Sexual Activity     Alcohol use: No     Drug use: No     Comment: caffeine 1/d     Sexual activity: Yes     Partners: Female   Other Topics Concern     Parent/sibling w/ CABG, MI or angioplasty before 65F 55M? Not Asked   Social History Narrative     None     Social Determinants of Health     Financial Resource Strain: Not on file   Food Insecurity: Not on file   Transportation Needs: Not on file   Physical Activity: Not on file   Stress: Not on file   Social Connections: Not on file   Intimate Partner Violence: Not on file   Housing Stability: Not on file       Review of Systems:  Skin:  not assessed       Eyes:  not assessed      ENT:  Positive for tinnitus hx tinnitus  Respiratory:  Negative       Cardiovascular:  Negative      Gastroenterology: Negative      Genitourinary:  not assessed      Musculoskeletal:  Negative      Neurologic:  Negative      Psychiatric:  Negative      Heme/Lymph/Imm:  not assessed      Endocrine:  Negative        Physical Exam:  Vitals: BP (!) 148/82 (BP Location: Right arm, Patient Position: Sitting, Cuff Size: Adult Regular)   Pulse 96   Ht 1.676 m (5' 6\")   Wt 75.2 kg (165 lb 12.8 oz)   SpO2 99%   BMI 26.76 kg/m      Constitutional:  cooperative;well nourished        Skin:  warm and dry to the touch          Head:  normocephalic        Eyes:  pupils equal and round        Lymph:No Cervical lymphadenopathy present;No thyromegaly     ENT:           Neck:  JVP normal transmitted murmur minimal    Respiratory:  clear to auscultation;normal respiratory excursion         Cardiac: regular rhythm       systolic ejection murmur;early systolic murmur;grade 1     tambour quality s2  pulses full and equal                                        GI:  abdomen soft        Extremities and Muscular Skeletal:  no edema    "           Neurological:  no gross motor deficits        Psych:  Alert and Oriented x 3          CC  Rohan Roe MD  [address]  [place] [id number]

## 2022-03-14 ENCOUNTER — OFFICE VISIT (OUTPATIENT)
Dept: CARDIOLOGY | Facility: CLINIC | Age: 62
End: 2022-03-14
Attending: INTERNAL MEDICINE
Payer: COMMERCIAL

## 2022-03-14 VITALS
BODY MASS INDEX: 26.65 KG/M2 | SYSTOLIC BLOOD PRESSURE: 148 MMHG | DIASTOLIC BLOOD PRESSURE: 82 MMHG | WEIGHT: 165.8 LBS | OXYGEN SATURATION: 99 % | HEIGHT: 66 IN | HEART RATE: 96 BPM

## 2022-03-14 DIAGNOSIS — I10 BENIGN ESSENTIAL HYPERTENSION: Primary | ICD-10-CM

## 2022-03-14 DIAGNOSIS — Q23.81 BICUSPID AORTIC VALVE: ICD-10-CM

## 2022-03-14 PROCEDURE — 99214 OFFICE O/P EST MOD 30 MIN: CPT | Performed by: NURSE PRACTITIONER

## 2022-03-14 RX ORDER — LISINOPRIL 5 MG/1
5 TABLET ORAL DAILY
Qty: 30 TABLET | Refills: 3 | Status: SHIPPED | OUTPATIENT
Start: 2022-03-14 | End: 2022-04-19

## 2022-03-14 NOTE — LETTER
3/14/2022      RE: Joshua Henderson  [address] [facility] [address] [address]       HISTORY OF PRESENT ILLNESS:    This is a 61 year old male who follows with Dr Roe at [facility] for congenital aortic stenosis.      Mr Henderson's echocardiogram in 1996 showed concern for bicuspid aortic.  A follow up ECHO (2002) showed preserved LVEF and likely bicuspid aortic valve with trivial aortic stenosis.  Overall, he has been free from any significant cardiovascular complaints  His blood pressure was borderline elevated when seen in clinic last month    Our visit today is for further review and surveillance ECHO    Mr Henderson overall feels well without any significant cardiovascular complaints  He admits to being fairly sedentary during the COVID isolation and will start walking outside now that it is getting warm  He denies any chest pain, shortness of breath, palpitations, orthopnea, or peripheral edema.  He is surprised that his blood pressure is elevated and we reviewed his recent blood pressure recordings.      I reviewed his ECHO (3/8/22)  This showed LVEF 55-60% without regional wall motion abnormalities, normal RV function, sclerotic aortic valve (cannot exclude bicuspid aortic valve) Ascending aorta 4.2 cm  BP: 168/82      VITAL SIGNS:  BP: 148/82  Pulse: 96  Weight: 165 lbs (BMI: 26)          IMPRESSION AND PLAN:    Possible Bicuspid Aortic Valve  -sclerosis without significant stenosis  -mean gradient 11 mmHg, MIKAEL 1.8 cm2    Hypertension:  -BP elevated  -will add Lisinopril 5 mg/day  -return in 1 month with BMP    Mild Ascending Aortic Dilatation  -4.2 cm  -repeat ECHO in 2 yrs    The total time for the visit today was 30 minutes which includes patient visit, reviewing of records, discussion, and placing of orders of the outpatient coordination of cardiovascular care as described.  The level of medical decision making during this visit was of moderate complexity.  Thank you for allowing me to  participate in their care.    Orders Placed This Encounter   Procedures     Basic metabolic panel     Follow-Up with Cardiology       Orders Placed This Encounter   Medications     lisinopril (ZESTRIL) 5 MG tablet     Sig: Take 1 tablet (5 mg) by mouth daily     Dispense:  30 tablet     Refill:  3       There are no discontinued medications.      Encounter Diagnoses   Name Primary?     Bicuspid aortic valve      Benign essential hypertension Yes       CURRENT MEDICATIONS:  Current Outpatient Medications   Medication Sig Dispense Refill     calcium carbonate (OS-RICHMOND 500 MG [place]. [place]) 500 MG tablet Take 500 mg by mouth 2 times daily       famotidine (PEPCID) 20 MG tablet Take 20 mg by mouth 2 times daily as needed       lisinopril (ZESTRIL) 5 MG tablet Take 1 tablet (5 mg) by mouth daily 30 tablet 3     multivitamin, therapeutic with minerals (THERA-VIT-M) TABS Take 1 tablet by mouth daily         ALLERGIES     Allergies   Allergen Reactions     Sulfa Drugs      Duricef [Cefadroxil Monohydrate] Rash     Rash         PAST MEDICAL HISTORY:  Past Medical History:   Diagnosis Date     Annular pancreas      Congenital aortic stenosis     mild  bicuspid aortic valve     Epididymitis, bilateral      Gastroesophageal reflux disease without esophagitis     rare     Scoliosis     kyphosis, prior bracing        PAST SURGICAL HISTORY:  Past Surgical History:   Procedure Laterality Date     CARDIAC CATH ARTERIAL      age 8, dx bicuspid aortic valve     SURGICAL HISTORY OF -       excision annular pancreas lesion age 3days       FAMILY HISTORY:  Family History   Problem Relation Age of Onset     Cancer Father          55 of metastatic melanoma     Hypertension Father      Cardiovascular Mother         bradycardia, pacemaker     Hypertension Mother      Diabetes Brother      Cancer - colorectal No family hx of      Prostate Cancer No family hx of        SOCIAL HISTORY:  Social History     Socioeconomic History     Marital status:  "     Spouse name: None     Number of children: None     Years of education: None     Highest education level: None   Occupational History     None   Tobacco Use     Smoking status: Never Smoker     Smokeless tobacco: Never Used   Substance and Sexual Activity     Alcohol use: No     Drug use: No     Comment: caffeine 1/d     Sexual activity: Yes     Partners: Female   Other Topics Concern     Parent/sibling w/ CABG, MI or angioplasty before 65F 55M? Not Asked   Social History Narrative     None     Social Determinants of Health     Financial Resource Strain: Not on file   Food Insecurity: Not on file   Transportation Needs: Not on file   Physical Activity: Not on file   Stress: Not on file   Social Connections: Not on file   Intimate Partner Violence: Not on file   Housing Stability: Not on file       Review of Systems:  Skin:  not assessed       Eyes:  not assessed      ENT:  Positive for tinnitus hx tinnitus  Respiratory:  Negative       Cardiovascular:  Negative      Gastroenterology: Negative      Genitourinary:  not assessed      Musculoskeletal:  Negative      Neurologic:  Negative      Psychiatric:  Negative      Heme/Lymph/Imm:  not assessed      Endocrine:  Negative        Physical Exam:  Vitals: BP (!) 148/82 (BP Location: Right arm, Patient Position: Sitting, Cuff Size: Adult Regular)   Pulse 96   Ht 1.676 m (5' 6\")   Wt 75.2 kg (165 lb 12.8 oz)   SpO2 99%   BMI 26.76 kg/m      Constitutional:  cooperative;well nourished        Skin:  warm and dry to the touch          Head:  normocephalic        Eyes:  pupils equal and round        Lymph:No Cervical lymphadenopathy present;No thyromegaly     ENT:           Neck:  JVP normal transmitted murmur minimal    Respiratory:  clear to auscultation;normal respiratory excursion         Cardiac: regular rhythm       systolic ejection murmur;early systolic murmur;grade 1     tambour quality s2  pulses full and equal                                    "     GI:  abdomen soft        Extremities and Muscular Skeletal:  no edema              Neurological:  no gross motor deficits        Psych:  Alert and Oriented x 3            Dacia HERIBERTO Tavares CNP

## 2022-03-14 NOTE — LETTER
3/14/2022    Paul Leblanc MD  [address] [place] [address]    RE: Joshua Martinn       Dear Colleague,     I had the pleasure of seeing Joshua Henderson in the [facility].  HISTORY OF PRESENT ILLNESS:    This is a 61 year old male who follows with Dr Roe at [facility] for congenital aortic stenosis.      Mr Henderson's echocardiogram in 1996 showed concern for bicuspid aortic.  A follow up ECHO (2002) showed preserved LVEF and likely bicuspid aortic valve with trivial aortic stenosis.  Overall, he has been free from any significant cardiovascular complaints  His blood pressure was borderline elevated when seen in clinic last month    Our visit today is for further review and surveillance ECHO    Mr Henderson overall feels well without any significant cardiovascular complaints  He admits to being fairly sedentary during the COVID isolation and will start walking outside now that it is getting warm  He denies any chest pain, shortness of breath, palpitations, orthopnea, or peripheral edema.  He is surprised that his blood pressure is elevated and we reviewed his recent blood pressure recordings.      I reviewed his ECHO (3/8/22)  This showed LVEF 55-60% without regional wall motion abnormalities, normal RV function, sclerotic aortic valve (cannot exclude bicuspid aortic valve) Ascending aorta 4.2 cm  BP: 168/82      VITAL SIGNS:  BP: 148/82  Pulse: 96  Weight: 165 lbs (BMI: 26)          IMPRESSION AND PLAN:    Possible Bicuspid Aortic Valve  -sclerosis without significant stenosis  -mean gradient 11 mmHg, MIKAEL 1.8 cm2    Hypertension:  -BP elevated  -will add Lisinopril 5 mg/day  -return in 1 month with BMP    Mild Ascending Aortic Dilatation  -4.2 cm  -repeat ECHO in 2 yrs    The total time for the visit today was 30 minutes which includes patient visit, reviewing of records, discussion, and placing of orders of the outpatient coordination of cardiovascular care as  described.  The level of medical decision making during this visit was of moderate complexity.  Thank you for allowing me to participate in their care.    Orders Placed This Encounter   Procedures     Basic metabolic panel     Follow-Up with Cardiology       Orders Placed This Encounter   Medications     lisinopril (ZESTRIL) 5 MG tablet     Sig: Take 1 tablet (5 mg) by mouth daily     Dispense:  30 tablet     Refill:  3       There are no discontinued medications.      Encounter Diagnoses   Name Primary?     Bicuspid aortic valve      Benign essential hypertension Yes       CURRENT MEDICATIONS:  Current Outpatient Medications   Medication Sig Dispense Refill     calcium carbonate (OS-RICHMOND 500 MG [place]. [place]) 500 MG tablet Take 500 mg by mouth 2 times daily       famotidine (PEPCID) 20 MG tablet Take 20 mg by mouth 2 times daily as needed       lisinopril (ZESTRIL) 5 MG tablet Take 1 tablet (5 mg) by mouth daily 30 tablet 3     multivitamin, therapeutic with minerals (THERA-VIT-M) TABS Take 1 tablet by mouth daily         ALLERGIES     Allergies   Allergen Reactions     Sulfa Drugs      Duricef [Cefadroxil Monohydrate] Rash     Rash         PAST MEDICAL HISTORY:  Past Medical History:   Diagnosis Date     Annular pancreas      Congenital aortic stenosis     mild  bicuspid aortic valve     Epididymitis, bilateral      Gastroesophageal reflux disease without esophagitis     rare     Scoliosis     kyphosis, prior bracing        PAST SURGICAL HISTORY:  Past Surgical History:   Procedure Laterality Date     CARDIAC CATH ARTERIAL      age 8, dx bicuspid aortic valve     SURGICAL HISTORY OF -       excision annular pancreas lesion age 3days       FAMILY HISTORY:  Family History   Problem Relation Age of Onset     Cancer Father          55 of metastatic melanoma     Hypertension Father      Cardiovascular Mother         bradycardia, pacemaker     Hypertension Mother      Diabetes Brother      Cancer - colorectal No family hx  "of      Prostate Cancer No family hx of        SOCIAL HISTORY:  Social History     Socioeconomic History     Marital status:      Spouse name: None     Number of children: None     Years of education: None     Highest education level: None   Occupational History     None   Tobacco Use     Smoking status: Never Smoker     Smokeless tobacco: Never Used   Substance and Sexual Activity     Alcohol use: No     Drug use: No     Comment: caffeine 1/d     Sexual activity: Yes     Partners: Female   Other Topics Concern     Parent/sibling w/ CABG, MI or angioplasty before 65F 55M? Not Asked   Social History Narrative     None     Social Determinants of Health     Financial Resource Strain: Not on file   Food Insecurity: Not on file   Transportation Needs: Not on file   Physical Activity: Not on file   Stress: Not on file   Social Connections: Not on file   Intimate Partner Violence: Not on file   Housing Stability: Not on file       Review of Systems:  Skin:  not assessed       Eyes:  not assessed      ENT:  Positive for tinnitus hx tinnitus  Respiratory:  Negative       Cardiovascular:  Negative      Gastroenterology: Negative      Genitourinary:  not assessed      Musculoskeletal:  Negative      Neurologic:  Negative      Psychiatric:  Negative      Heme/Lymph/Imm:  not assessed      Endocrine:  Negative        Physical Exam:  Vitals: BP (!) 148/82 (BP Location: Right arm, Patient Position: Sitting, Cuff Size: Adult Regular)   Pulse 96   Ht 1.676 m (5' 6\")   Wt 75.2 kg (165 lb 12.8 oz)   SpO2 99%   BMI 26.76 kg/m      Constitutional:  cooperative;well nourished        Skin:  warm and dry to the touch          Head:  normocephalic        Eyes:  pupils equal and round        Lymph:No Cervical lymphadenopathy present;No thyromegaly     ENT:           Neck:  JVP normal transmitted murmur minimal    Respiratory:  clear to auscultation;normal respiratory excursion         Cardiac: regular rhythm       systolic " ejection murmur;early systolic murmur;grade 1     tambour quality s2  pulses full and equal                                        GI:  abdomen soft        Extremities and Muscular Skeletal:  no edema              Neurological:  no gross motor deficits        Psych:  Alert and Oriented x 3          CC  Rohan Roe MD  [address]  [place] [id number]                      Thank you for allowing me to participate in the care of your patient.      Sincerely,     HERIBERTO Owens [facility]  cc:   Rohan Roe MD  [address]  [place] [id number]

## 2022-04-18 NOTE — PROGRESS NOTES
"HISTORY OF PRESENT ILLNESS:     This is a 61 year old male who follows with Dr Roe at [facility] for congenital aortic stenosis.       Mr Henderson's echocardiogram in 1996 showed concern for bicuspid aortic.  A follow up ECHO (2002) showed preserved LVEF and likely bicuspid aortic valve with trivial aortic stenosis.  Overall, he has been free from any significant cardiovascular complaints      A surveillance ECHO (3/8/22) showed LVEF 55-60% without regional wall motion abnormalities, normal RV function, sclerotic aortic valve (cannot exclude bicuspid aortic valve) Ascending aorta 4.2 cm  BP: 168/82    He was started on Lisinopril last month for hypertension.      Our visit today is for further review and labs    Mr Henderson tells me that he developed some lightheadedness and brain fog after starting Lisinopril in the am  His BPs at home have been similar to our reading today  He started taking Lisinopril before bedtime and has some improvement in his daytime \"fogginess\"  He denies any chest pain, shortness of breath, palpitations, orthopnea, or peripheral edema.  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  He does not think sleep apnea is an issue       I reviewed his labs today:  Sodium: 140  Potassium: 3.7  BUN: 13  Creatinine: 0.85     VITAL SIGNS:  BP: 98/62  Pulse:  84  Weight:  164 lbs (stable)  BMI: 26              IMPRESSION AND PLAN:     Possible Bicuspid Aortic Valve  -sclerosis without significant stenosis  -mean gradient 11 mmHg, MIKAEL 1.8 cm2     Hypertension:  -on Lisinopril 5 mg  -will lower Lisinopril to 25 mg due to some symptomatic low BP readings  -he will call with any intolerance  -return in 6 months     Mild Ascending Aortic Dilatation  -4.2 cm  -repeat ECHO (3/2024)    The total time for the visit today was 30 minutes which includes patient visit, reviewing of records, discussion, and " placing of orders of the outpatient coordination of cardiovascular care as described.  The level of medical decision making during this visit was of moderate complexity.  Thank you for allowing me to participate in their care.    Orders Placed This Encounter   Procedures     Follow-Up with Cardiology       Orders Placed This Encounter   Medications     lisinopril (ZESTRIL) 2.5 MG tablet     Sig: Take 1 tablet (2.5 mg) by mouth daily     Dispense:  90 tablet     Refill:  3       Medications Discontinued During This Encounter   Medication Reason     lisinopril (ZESTRIL) 5 MG tablet          Encounter Diagnosis   Name Primary?     Benign essential hypertension        CURRENT MEDICATIONS:  Current Outpatient Medications   Medication Sig Dispense Refill     calcium carbonate (OS-RICHMOND 500 MG [place]. [place]) 500 MG tablet Take 500 mg by mouth 2 times daily       famotidine (PEPCID) 20 MG tablet Take 20 mg by mouth 2 times daily as needed       lisinopril (ZESTRIL) 2.5 MG tablet Take 1 tablet (2.5 mg) by mouth daily 90 tablet 3     multivitamin, therapeutic with minerals (THERA-VIT-M) TABS Take 1 tablet by mouth daily         ALLERGIES     Allergies   Allergen Reactions     Sulfa Drugs      Duricef [Cefadroxil Monohydrate] Rash     Rash         PAST MEDICAL HISTORY:  Past Medical History:   Diagnosis Date     Annular pancreas      Congenital aortic stenosis     mild  bicuspid aortic valve     Epididymitis, bilateral      Gastroesophageal reflux disease without esophagitis     rare     Scoliosis     kyphosis, prior bracing        PAST SURGICAL HISTORY:  Past Surgical History:   Procedure Laterality Date     CARDIAC CATH ARTERIAL      age 8, dx bicuspid aortic valve     SURGICAL HISTORY OF -       excision annular pancreas lesion age 3days       FAMILY HISTORY:  Family History   Problem Relation Age of Onset     Cancer Father          55 of metastatic melanoma     Hypertension Father      Cardiovascular Mother          "bradycardia, pacemaker     Hypertension Mother      Diabetes Brother      Cancer - colorectal No family hx of      Prostate Cancer No family hx of        SOCIAL HISTORY:  Social History     Socioeconomic History     Marital status:      Spouse name: None     Number of children: None     Years of education: None     Highest education level: None   Tobacco Use     Smoking status: Never Smoker     Smokeless tobacco: Never Used   Substance and Sexual Activity     Alcohol use: No     Drug use: No     Comment: caffeine 1/d     Sexual activity: Yes     Partners: Female       Review of Systems:  Skin:  Negative       Eyes:  Negative      ENT:  Negative      Respiratory:  Positive for cough     Cardiovascular:    fatigue;Positive for    Gastroenterology: Positive for heartburn    Genitourinary:  not assessed      Musculoskeletal:  Negative      Neurologic:  Negative      Psychiatric:  Negative      Heme/Lymph/Imm:  Positive for hay fever    Endocrine:  Negative        Physical Exam:  Vitals: BP 98/62   Pulse 84   Ht 1.676 m (5' 6\")   Wt 74.4 kg (164 lb)   BMI 26.47 kg/m      Constitutional:  cooperative;well nourished        Skin:  warm and dry to the touch          Head:  normocephalic        Eyes:  pupils equal and round        Lymph:      ENT:           Neck:  JVP normal transmitted murmur minimal    Respiratory:  clear to auscultation;normal respiratory excursion         Cardiac: regular rhythm       systolic ejection murmur;early systolic murmur;grade 1     tambour quality s2  pulses full and equal                                        GI:  abdomen soft        Extremities and Muscular Skeletal:  no edema              Neurological:  no gross motor deficits        Psych:  Alert and Oriented x 3          CC  HERIBERTO Romero CNP  [address]  [place] [id number]                  "

## 2022-04-19 ENCOUNTER — LAB (OUTPATIENT)
Dept: LAB | Facility: CLINIC | Age: 62
End: 2022-04-19
Payer: COMMERCIAL

## 2022-04-19 ENCOUNTER — OFFICE VISIT (OUTPATIENT)
Dept: CARDIOLOGY | Facility: CLINIC | Age: 62
End: 2022-04-19
Attending: NURSE PRACTITIONER
Payer: COMMERCIAL

## 2022-04-19 VITALS
DIASTOLIC BLOOD PRESSURE: 62 MMHG | SYSTOLIC BLOOD PRESSURE: 98 MMHG | BODY MASS INDEX: 26.36 KG/M2 | HEART RATE: 84 BPM | WEIGHT: 164 LBS | HEIGHT: 66 IN

## 2022-04-19 DIAGNOSIS — I10 BENIGN ESSENTIAL HYPERTENSION: ICD-10-CM

## 2022-04-19 LAB
ANION GAP SERPL CALCULATED.3IONS-SCNC: 3 MMOL/L (ref 3–14)
BUN SERPL-MCNC: 13 MG/DL (ref 7–30)
CALCIUM SERPL-MCNC: 8.5 MG/DL (ref 8.5–10.1)
CHLORIDE BLD-SCNC: 109 MMOL/L (ref 94–109)
CO2 SERPL-SCNC: 28 MMOL/L (ref 20–32)
CREAT SERPL-MCNC: 0.85 MG/DL (ref 0.66–1.25)
GFR SERPL CREATININE-BSD FRML MDRD: >90 ML/MIN/1.73M2
GLUCOSE BLD-MCNC: 118 MG/DL (ref 70–99)
POTASSIUM BLD-SCNC: 3.7 MMOL/L (ref 3.4–5.3)
SODIUM SERPL-SCNC: 140 MMOL/L (ref 133–144)

## 2022-04-19 PROCEDURE — 36415 COLL VENOUS BLD VENIPUNCTURE: CPT | Performed by: NURSE PRACTITIONER

## 2022-04-19 PROCEDURE — 80048 BASIC METABOLIC PNL TOTAL CA: CPT | Performed by: NURSE PRACTITIONER

## 2022-04-19 PROCEDURE — 99214 OFFICE O/P EST MOD 30 MIN: CPT | Performed by: NURSE PRACTITIONER

## 2022-04-19 RX ORDER — LISINOPRIL 2.5 MG/1
2.5 TABLET ORAL DAILY
Qty: 90 TABLET | Refills: 3 | Status: SHIPPED | OUTPATIENT
Start: 2022-04-19 | End: 2023-04-04

## 2022-04-19 NOTE — LETTER
"4/19/2022    Paul Leblanc MD  [address] [place] [address]    RE: Joshua Martinn       Dear Colleague,     I had the pleasure of seeing Joshua Henderson in the [facility].  HISTORY OF PRESENT ILLNESS:     This is a 61 year old male who follows with Dr Roe at [facility] for congenital aortic stenosis.       Mr Henderson's echocardiogram in 1996 showed concern for bicuspid aortic.  A follow up ECHO (2002) showed preserved LVEF and likely bicuspid aortic valve with trivial aortic stenosis.  Overall, he has been free from any significant cardiovascular complaints      A surveillance ECHO (3/8/22) showed LVEF 55-60% without regional wall motion abnormalities, normal RV function, sclerotic aortic valve (cannot exclude bicuspid aortic valve) Ascending aorta 4.2 cm  BP: 168/82    He was started on Lisinopril last month for hypertension.      Our visit today is for further review and labs    Mr Henderson tells me that he developed some lightheadedness and brain fog after starting Lisinopril in the am  His BPs at home have been similar to our reading today  He started taking Lisinopril before bedtime and has some improvement in his daytime \"fogginess\"  He denies any chest pain, shortness of breath, palpitations, orthopnea, or peripheral edema.  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  He does not think sleep apnea is an issue       I reviewed his labs today:  Sodium: 140  Potassium: 3.7  BUN: 13  Creatinine: 0.85     VITAL SIGNS:  BP: 98/62  Pulse:  84  Weight:  164 lbs (stable)  BMI: 26              IMPRESSION AND PLAN:     Possible Bicuspid Aortic Valve  -sclerosis without significant stenosis  -mean gradient 11 mmHg, MIKAEL 1.8 cm2     Hypertension:  -on Lisinopril 5 mg  -will lower Lisinopril to 25 mg due to some symptomatic low BP readings  -he will call with any " intolerance  -return in 6 months     Mild Ascending Aortic Dilatation  -4.2 cm  -repeat ECHO (3/2024)    The total time for the visit today was 30 minutes which includes patient visit, reviewing of records, discussion, and placing of orders of the outpatient coordination of cardiovascular care as described.  The level of medical decision making during this visit was of moderate complexity.  Thank you for allowing me to participate in their care.    Orders Placed This Encounter   Procedures     Follow-Up with Cardiology       Orders Placed This Encounter   Medications     lisinopril (ZESTRIL) 2.5 MG tablet     Sig: Take 1 tablet (2.5 mg) by mouth daily     Dispense:  90 tablet     Refill:  3       Medications Discontinued During This Encounter   Medication Reason     lisinopril (ZESTRIL) 5 MG tablet          Encounter Diagnosis   Name Primary?     Benign essential hypertension        CURRENT MEDICATIONS:  Current Outpatient Medications   Medication Sig Dispense Refill     calcium carbonate (OS-RICHMOND 500 MG [place]. [place]) 500 MG tablet Take 500 mg by mouth 2 times daily       famotidine (PEPCID) 20 MG tablet Take 20 mg by mouth 2 times daily as needed       lisinopril (ZESTRIL) 2.5 MG tablet Take 1 tablet (2.5 mg) by mouth daily 90 tablet 3     multivitamin, therapeutic with minerals (THERA-VIT-M) TABS Take 1 tablet by mouth daily         ALLERGIES     Allergies   Allergen Reactions     Sulfa Drugs      Duricef [Cefadroxil Monohydrate] Rash     Rash         PAST MEDICAL HISTORY:  Past Medical History:   Diagnosis Date     Annular pancreas      Congenital aortic stenosis     mild  bicuspid aortic valve     Epididymitis, bilateral      Gastroesophageal reflux disease without esophagitis     rare     Scoliosis     kyphosis, prior bracing        PAST SURGICAL HISTORY:  Past Surgical History:   Procedure Laterality Date     CARDIAC CATH ARTERIAL      age 8, dx bicuspid aortic valve     SURGICAL HISTORY OF -       excision annular  "pancreas lesion age 3days       FAMILY HISTORY:  Family History   Problem Relation Age of Onset     Cancer Father          55 of metastatic melanoma     Hypertension Father      Cardiovascular Mother         bradycardia, pacemaker     Hypertension Mother      Diabetes Brother      Cancer - colorectal No family hx of      Prostate Cancer No family hx of        SOCIAL HISTORY:  Social History     Socioeconomic History     Marital status:      Spouse name: None     Number of children: None     Years of education: None     Highest education level: None   Tobacco Use     Smoking status: Never Smoker     Smokeless tobacco: Never Used   Substance and Sexual Activity     Alcohol use: No     Drug use: No     Comment: caffeine 1/d     Sexual activity: Yes     Partners: Female       Review of Systems:  Skin:  Negative       Eyes:  Negative      ENT:  Negative      Respiratory:  Positive for cough     Cardiovascular:    fatigue;Positive for    Gastroenterology: Positive for heartburn    Genitourinary:  not assessed      Musculoskeletal:  Negative      Neurologic:  Negative      Psychiatric:  Negative      Heme/Lymph/Imm:  Positive for hay fever    Endocrine:  Negative        Physical Exam:  Vitals: BP 98/62   Pulse 84   Ht 1.676 m (5' 6\")   Wt 74.4 kg (164 lb)   BMI 26.47 kg/m      Constitutional:  cooperative;well nourished        Skin:  warm and dry to the touch          Head:  normocephalic        Eyes:  pupils equal and round        Lymph:      ENT:           Neck:  JVP normal transmitted murmur minimal    Respiratory:  clear to auscultation;normal respiratory excursion         Cardiac: regular rhythm       systolic ejection murmur;early systolic murmur;grade 1     tambour quality s2  pulses full and equal                                        GI:  abdomen soft        Extremities and Muscular Skeletal:  no edema              Neurological:  no gross motor deficits        Psych:  Alert and Oriented x 3  "         CC  HERIBERTO Romero CNP  [address]  [place] [id number]    Thank you for allowing me to participate in the care of your patient.      Sincerely,     HERIBERTO Owens CNP     [facility]  cc:   HERIBERTO Romero CNP  [address]  [place] [id number]

## 2022-05-16 ENCOUNTER — NURSE TRIAGE (OUTPATIENT)
Dept: NURSING | Facility: CLINIC | Age: 62
End: 2022-05-16
Payer: COMMERCIAL

## 2022-05-16 ENCOUNTER — VIRTUAL VISIT (OUTPATIENT)
Dept: FAMILY MEDICINE | Facility: CLINIC | Age: 62
End: 2022-05-16
Payer: COMMERCIAL

## 2022-05-16 DIAGNOSIS — U07.1 INFECTION DUE TO 2019 NOVEL CORONAVIRUS: Primary | ICD-10-CM

## 2022-05-16 PROCEDURE — 99203 OFFICE O/P NEW LOW 30 MIN: CPT | Mod: 95 | Performed by: PHYSICIAN ASSISTANT

## 2022-05-16 RX ORDER — ALBUTEROL SULFATE 90 UG/1
2 AEROSOL, METERED RESPIRATORY (INHALATION) EVERY 6 HOURS
Qty: 18 G | Refills: 0 | Status: SHIPPED | OUTPATIENT
Start: 2022-05-16 | End: 2022-05-16

## 2022-05-16 RX ORDER — PREDNISONE 20 MG/1
40 TABLET ORAL DAILY
Qty: 10 TABLET | Refills: 0 | Status: SHIPPED | OUTPATIENT
Start: 2022-05-16 | End: 2022-05-21

## 2022-05-16 RX ORDER — BENZONATATE 200 MG/1
200 CAPSULE ORAL 3 TIMES DAILY PRN
Qty: 30 CAPSULE | Refills: 1 | Status: SHIPPED | OUTPATIENT
Start: 2022-05-16 | End: 2022-10-18

## 2022-05-16 NOTE — PROGRESS NOTES
Joshua is a 61 year old who is being evaluated via a billable telephone visit.      What phone number would you like to be contacted at? See demo  How would you like to obtain your AVS? MyChart    Assessment & Plan     Infection due to 2019 novel coronavirus  Pros and cons of antiviral treatment discussed with patient at length; mutually agreed to hold option at this time; prescription and over the counter supportive care discussed with patient at length and prescription sent to pharmacy; see patient instructions. Return to clinic with any worsening or changes in symptoms or go to ER Urgent care in off hours.  Return to clinic with any worsening or changes in symptoms and follow up with PCP for routine care.   - albuterol (PROAIR HFA/PROVENTIL HFA/VENTOLIN HFA) 108 (90 Base) MCG/ACT inhaler; Inhale 2 puffs into the lungs every 6 hours  - benzonatate (TESSALON) 200 MG capsule; Take 1 capsule (200 mg) by mouth 3 times daily as needed for cough  - predniSONE (DELTASONE) 20 MG tablet; Take 2 tablets (40 mg) by mouth daily for 5 days    Review of prior external note(s) from - previous routine notes  30 minutes spent on the date of the encounter doing chart review, history and exam, documentation and further activities per the note       Patient Instructions   Encouraged mucinex/guafenisin, warm salt water gargles, cepacol spray, soothers/lozenges, sinus rinses (neilmed), flonase (2 sprays per nostril daily x 2 weeks), vitamin c, fluids and rest.  May alternate tylenol and NSAIDS (ibuprofen, advil, aleve type products) every 4-6 hours for the next few days as needed.    Prescription for rescue inhaler (albuterol) sent to pharmacy - 2 puffs every 4-6 hours, at least every morning.   Prescription for oral prednisone sent to pharmacy - TAKE with FOOD and earlier in the day.  Return to clinic with any worsening or changes in symptoms.    Discharge Instructions for COVID-19 Patients  You have--or may have--COVID-19. Please  "follow the instructions listed below.   If you have a weakened immune system, discuss with your doctor any other actions you need to take.  How can I protect others?  If you have symptoms (fever, cough, body aches or trouble breathing):  1. Stay home and away from others (self-isolate) until:  ? Your other symptoms have resolved (gotten better). And   ? You've had no fever--and no medicine that reduces fever--for 1 full day (24 hours). And   ? At least 10 days have passed since your symptoms started. (You may need to wait 20 days. Follow the advice of your care team.)  If you don't show symptoms, but testing showed that you have COVID-19:    Stay home and away from others (self-isolate) until at least 10 days have passed since the date of your first positive COVID-19 test.  During this time  1. Stay in your own room, even for meals. Use your own bathroom if you can.  2. Stay away from others in your home. No hugging, kissing or shaking hands. No visitors.  3. Don't go to work, school or anywhere else.  4. Clean \"high touch\" surfaces often (doorknobs, counters, handles). Use household cleaning spray or wipes.  5. You'll find a full list of  on the EPA website: www.epa.gov/pesticide-registration/list-n-disinfectants-use-against-sars-cov-2.  6. Cover your mouth and nose with a mask or other face covering to avoid spreading germs.  7. Wash your hands and face often. Use soap and water.  8. Caregivers in these groups are at risk for severe illness due to COVID-19:  1. People 65 years and older  2. People who live in a nursing home or long-term care facility  3. People with chronic disease (lung, heart, cancer, diabetes, kidney, liver, immunologic)  4. People who have a weakened immune system, including those who:    Are in cancer treatment    Take medicine that weakens the immune system, such as corticosteroids    Had a bone marrow or organ transplant    Have an immune deficiency    Have poorly controlled HIV or " AIDS    Are obese (body mass index of 40 or higher)    Smoke regularly  9. Caregivers should wear gloves while washing dishes, handling laundry and cleaning bedrooms and bathrooms.  10. Use caution when washing and drying laundry: Don't shake dirty laundry and use the warmest water setting that you can.  11. For more tips on managing your health at home, go to www.cdc.gov/coronavirus/2019-ncov/downloads/10Things.pdf.  How can I take care of myself at home?  1. Get lots of rest. Drink extra fluids (unless a doctor has told you not to).  2. Take Tylenol (acetaminophen) for fever or pain. If you have liver or kidney problems, ask your family doctor if it's okay to take Tylenol.   Adults can take either:   ? 650 mg (two 325 mg pills) every 4 to 6 hours, or   ? 1,000 mg (two 500 mg pills) every 8 hours as needed.  ? Note: Don't take more than 3,000 mg in one day. Acetaminophen is found in many medicines (both prescribed and over-the-counter medicines). Read all labels to be sure you don't take too much.   For children, check the Tylenol bottle for the right dose. The dose is based on the child's age or weight.  3. If you have other health problems (like cancer, heart failure, an organ transplant or severe kidney disease): Call your specialty clinic if you don't feel better in the next 2 days.  4. Know when to call 911. Emergency warning signs include:  ? Trouble breathing or shortness of breath  ? Pain or pressure in the chest that doesn't go away  ? Feeling confused like you haven't felt before, or not being able to wake up  ? Bluish-colored lips or face  5. Your doctor may have prescribed a blood thinner medicine. Follow their instructions.  Where can I get more information?  1.  [employer] - About COVID-19:   [email or web address]  2. CDC - What to Do If You're Sick: www.cdc.gov/coronavirus/2019-ncov/about/steps-when-sick.html  3. CDC - Ending Home Isolation:  "www.cdc.gov/coronavirus/2019-ncov/hcp/disposition-in-home-patients.html  4. [facility] - Caring for Someone: www.cdc.gov/coronavirus/2019-ncov/if-you-are-sick/care-for-someone.html  5. [facility] - Interim Guidance for Hospital Discharge to Home: www.health.[facility]placed [facility].[place]./diseases/coronavirus/hcp/hospdischarge.pdf  6. Below are the COVID-19 hotlines at the [place] Department of Health ([facility]). Interpreters are available.  ? For health questions: Call 782-344-5400 or 1-519.981.7324 (7 a.m. to 7 p.m.)  ? For questions about schools and childcare: Call 221-857-2612 or 1-541.134.8333 (7 a.m. to 7 p.m.)    For informational purposes only. Not to replace the advice of your health care provider. Clinically reviewed by Dr. Wero West.   Copyright   2020 [facility]. All rights reserved. M.Setek [id number] - REV 01/05/21.        Did you know?      You can schedule a video visit for follow-up appointments as well as future appointments for certain conditions.  Please see the below link.     Video Visits ([email or web address])     If you have not already done so,  I encourage you to sign up for UIEvolutiont ([email or web address]).  This will allow you to review your results, securely communicate with a provider, and schedule virtual visits as well.    COVID-19 positive patient.  Encounter for consideration of medication intervention. Patient does qualify for a prescription. Full discussion with patient including medication options, risks and benefits. Potential drug interactions reviewed with patient.     Treatment Planned supportive care     Estimated body mass index is 26.47 kg/m  as calculated from the following:    Height as of 4/19/22: 1.676 m (5' 6\").    Weight as of 4/19/22: 74.4 kg (164 lb).  GFR Estimate   Date Value Ref Range Status   04/19/2022 >90 >60 mL/min/1.73m2 Final     Comment:     Effective December 21, 2021 eGFRcr in adults is calculated using the 2021 CKD-EPI creatinine equation which includes age " and gender (Dominique gamboa al., [place], DOI: 10.1056/ZKYKdy4165125)   07/17/2012 >90 >60 mL/min/1.7m2 Final     No results found for: [id number]    Return in about 4 weeks (around 6/13/2022) for Follow up, with PCP, or sooner with worsening symptoms.    RACIEL Parks [facility]    Chey Lewis is a 61 year old who presents for the following health issues     HPI       COVID-19 Symptom Review  How many days ago did these symptoms start? 3 DAYS; +COVID on home test  Are any of the following symptoms significant for you?    New or worsening difficulty breathing? No    Worsening cough? Yes, I am coughing up mucus.    Fever or chills? Yes, the highest temperature was 100.7    Headache: YES    Sore throat: YES    Chest pain: no    Diarrhea: no    Body aches? YES    What treatments has patient tried? TYLENOL   Does patient live in a nursing home, group home, or shelter? no  Does patient have a way to get food/medications during quarantined? Yes, I have a friend or family member who can help me.          Patient is fully COVID vaccinated x 4    Review of Systems   Constitutional, HEENT, cardiovascular, pulmonary, GI, , musculoskeletal, neuro, skin, endocrine and psych systems are negative, except as otherwise noted.      Objective    Vitals - Patient Reported  Temperature (Patient Reported): 100.7  F (38.2  C)        Physical Exam   healthy, alert and no distress  PSYCH: Alert and oriented times 3; coherent speech, normal   rate and volume, able to articulate logical thoughts, able   to abstract reason, no tangential thoughts, no hallucinations   or delusions  His affect is normal  RESP: No cough, no audible wheezing, able to talk in full sentences  Remainder of exam unable to be completed due to telephone visits            Phone call duration: 20 minutes

## 2022-05-16 NOTE — PROGRESS NOTES
"Joshua is a 61 year old who is being evaluated via a billable video visit.      How would you like to obtain your AVS? MyChart  If the video visit is dropped, the invitation should be resent by: Text to cell phone: [phone]  Will anyone else be joining your video visit? No  {If patient encounters technical issues they should call [phone] :729323}    Video Start Time: {video visit start/end time for provider to select:357138}    Assessment & Plan     {Diag Picklist:696247}    Review of prior external note(s) from - {note reviewed source:644762}  *** minutes spent on the date of the encounter doing chart review, history and exam, documentation and further activities per the note  {Provider  Link to [facility] Help Grid :961437}     BMI:   Estimated body mass index is 26.47 kg/m  as calculated from the following:    Height as of 4/19/22: 1.676 m (5' 6\").    Weight as of 4/19/22: 74.4 kg (164 lb).       There are no Patient Instructions on file for this visit.  COVID-19 positive patient.  Encounter for consideration of medication intervention. Patient {DOES/NOT:786744::\"does\"} qualify for a prescription. Full discussion with patient including medication options, risks and benefits. Potential drug interactions reviewed with patient.     Treatment Planned {covid treatment options:442091}  {COVID treatment pharmacy (Optional):764585}  Temporary change to home medications: { :5455313::\" None \"}    Estimated body mass index is 26.47 kg/m  as calculated from the following:    Height as of 4/19/22: 1.676 m (5' 6\").    Weight as of 4/19/22: 74.4 kg (164 lb).  GFR Estimate   Date Value Ref Range Status   04/19/2022 >90 >60 mL/min/1.73m2 Final     Comment:     Effective December 21, 2021 eGFRcr in adults is calculated using the 2021 CKD-EPI creatinine equation which includes age and gender (Dominique gamboa al., NEJM, DOI: 10.1056/JFSFqi4038547)   07/17/2012 >90 >60 mL/min/1.7m2 Final     No results found for: RQTZO05WOH    No " follow-ups on file.    RACIEL Parks [facility]    Chey Lewis is a 61 year old who presents for the following health issues     HPI       COVID-19 Symptom Review  How many days ago did these symptoms start? 3 DAYS    Are any of the following symptoms significant for you?    New or worsening difficulty breathing? No    Worsening cough? Yes, I am coughing up mucus.    Fever or chills? Yes, the highest temperature was 100.7    Headache: YES    Sore throat: YES    Chest pain: no    Diarrhea: no    Body aches? YES    What treatments has patient tried? TYLENOL   Does patient live in a nursing home, group home, or shelter? no  Does patient have a way to get food/medications during quarantined? Yes, I have a friend or family member who can help me.    {Provider  Link to COVID SmartSet :062430}      Patient is fully COVID vaccinated x 4        Review of Systems   Constitutional, HEENT, cardiovascular, pulmonary, GI, , musculoskeletal, neuro, skin, endocrine and psych systems are negative, except as otherwise noted.      Objective           Vitals:  No vitals were obtained today due to virtual visit.    Physical Exam   GENERAL: Healthy, alert and no distress  EYES: Eyes grossly normal to inspection.  No discharge or erythema, or obvious scleral/conjunctival abnormalities.  RESP: No audible wheeze, cough, or visible cyanosis.  No visible retractions or increased work of breathing.    SKIN: Visible skin clear. No significant rash, abnormal pigmentation or lesions.  NEURO: Cranial nerves grossly intact.  Mentation and speech appropriate for age.  PSYCH: Mentation appears normal, affect normal/bright, judgement and insight intact, normal speech and appearance well-groomed.            Video-Visit Details    Type of service:  Video Visit    Video End Time:{video visit start/end time for provider to select:874438}    Originating Location (pt. Location): Home    Distant Location  "(provider location):  [facility]     Platform used for Video Visit: {Virtual Visit Platforms:566349::\"Sanjuana\"}  "

## 2022-05-16 NOTE — TELEPHONE ENCOUNTER
covid positive this morning.    Triages to be seen/message to pcp.    Asking for antiviral treatment and has questions for provider about treatment.  Best is to do phone visit.    Transferred to covid sched line.    Seda Kennedy RN  [facility] Nurse Advisor      Reason for Disposition    HIGH RISK for severe COVID complications (e.g., weak immune system, age > 64 years, obesity with BMI > 25, pregnant, chronic lung disease or other chronic medical condition)  (Exception: Already seen by PCP and no new or worsening symptoms.)    Additional Information    Negative: SEVERE difficulty breathing (e.g., struggling for each breath, speaks in single words)    Negative: Difficult to awaken or acting confused (e.g., disoriented, slurred speech)    Negative: Bluish (or gray) lips or face now    Negative: Shock suspected (e.g., cold/pale/clammy skin, too weak to stand, low BP, rapid pulse)    Negative: Sounds like a life-threatening emergency to the triager    Negative: [1] Diagnosed or suspected COVID-19 AND [2] symptoms lasting 3 or more weeks    Negative: [1] COVID-19 exposure AND [2] no symptoms    Negative: COVID-19 vaccine reaction suspected (e.g., fever, headache, muscle aches) occurring 1 to 3 days after getting vaccine    Negative: COVID-19 vaccine, questions about    Negative: [1] Lives with someone known to have influenza (flu test positive) AND [2] flu-like symptoms (e.g., cough, runny nose, sore throat, SOB; with or without fever)    Negative: [1] Adult with possible COVID-19 symptoms AND [2] triager concerned about severity of symptoms or other causes    Negative: COVID-19 and breastfeeding, questions about    Negative: SEVERE or constant chest pain or pressure  (Exception: Mild central chest pain, present only when coughing.)    Negative: MODERATE difficulty breathing (e.g., speaks in phrases, SOB even at rest, pulse 100-120)    Negative: [1] Headache AND [2] stiff neck (can't touch chin to chest)     Negative: Oxygen level (e.g., pulse oximetry) 90 percent or lower    Negative: Chest pain or pressure    Negative: Patient sounds very sick or weak to the triager    Negative: MILD difficulty breathing (e.g., minimal/no SOB at rest, SOB with walking, pulse <100)    Negative: Fever > 103 F (39.4 C)    Negative: [1] Fever > 101 F (38.3 C) AND [2] age > 60 years    Negative: [1] Fever > 100.0 F (37.8 C) AND [2] bedridden (e.g., nursing home patient, CVA, chronic illness, recovering from surgery)    Protocols used: CORONAVIRUS (COVID-19) DIAGNOSED OR [id number] 1.18.2022

## 2022-05-16 NOTE — PROGRESS NOTES
"Joshua is a 61 year old who is being evaluated via a billable telephone visit.      What phone number would you like to be contacted at? ***  How would you like to obtain your AVS? {AVS Preference:463700}    {PROVIDER CHARTING PREFERENCE:042490}    Subjective   Joshua is a 61 year old who presents for the following health issues {ACCOMPANIED BY STATEMENT (Optional):865106}    HPI       COVID-19 Symptom Review  How many days ago did these symptoms start? 3days    Are any of the following symptoms significant for you?    New or worsening difficulty breathing? No    Worsening cough? Yes, I am coughing up mucus.    Fever or chills? Yes, the highest temperature was 100.7    Headache: YES    Sore throat: YES    Chest pain: no    Diarrhea: no    Body aches? YES    What treatments has patient tried? TYLENOL   Does patient live in a nursing home, group home, or shelter? no  Does patient have a way to get food/medications during quarantined? Yes, I have a friend or family member who can help me.    {Provider  Link to COVID SmartSet :747621}          {additonal problems for provider to add (Optional):828974}    Review of Systems   {ROS COMP (Optional):665949}      Objective    Vitals - Patient Reported  Temperature (Patient Reported): 100.7  F (38.2  C)        Physical Exam   {GENERAL APPEARANCE:50::\"healthy\",\"alert\",\"no distress\"}  PSYCH: Alert and oriented times 3; coherent speech, normal   rate and volume, able to articulate logical thoughts, able   to abstract reason, no tangential thoughts, no hallucinations   or delusions  His affect is { :8653168::\"normal\"}  RESP: No cough, no audible wheezing, able to talk in full sentences  Remainder of exam unable to be completed due to telephone visits    {Diagnostic Test Results (Optional):920816}    {AMBULATORY ATTESTATION (Optional):023099}        Phone call duration: *** minutes  "

## 2022-05-16 NOTE — PATIENT INSTRUCTIONS
"Encouraged mucinex/guafenisin, warm salt water gargles, cepacol spray, soothers/lozenges, sinus rinses (neilmed), flonase (2 sprays per nostril daily x 2 weeks), vitamin c, fluids and rest.  May alternate tylenol and NSAIDS (ibuprofen, advil, aleve type products) every 4-6 hours for the next few days as needed.    Prescription for rescue inhaler (albuterol) sent to pharmacy - 2 puffs every 4-6 hours, at least every morning.   Prescription for oral prednisone sent to pharmacy - TAKE with FOOD and earlier in the day.  Return to clinic with any worsening or changes in symptoms.    Discharge Instructions for COVID-19 Patients  You have--or may have--COVID-19. Please follow the instructions listed below.   If you have a weakened immune system, discuss with your doctor any other actions you need to take.  How can I protect others?  If you have symptoms (fever, cough, body aches or trouble breathing):  Stay home and away from others (self-isolate) until:  Your other symptoms have resolved (gotten better). And   You've had no fever--and no medicine that reduces fever--for 1 full day (24 hours). And   At least 10 days have passed since your symptoms started. (You may need to wait 20 days. Follow the advice of your care team.)  If you don't show symptoms, but testing showed that you have COVID-19:  Stay home and away from others (self-isolate) until at least 10 days have passed since the date of your first positive COVID-19 test.  During this time  Stay in your own room, even for meals. Use your own bathroom if you can.  Stay away from others in your home. No hugging, kissing or shaking hands. No visitors.  Don't go to work, school or anywhere else.  Clean \"high touch\" surfaces often (doorknobs, counters, handles). Use household cleaning spray or wipes.  You'll find a full list of  on the EPA website: www.epa.gov/pesticide-registration/list-n-disinfectants-use-against-sars-cov-2.  Cover your mouth and nose with a mask " or other face covering to avoid spreading germs.  Wash your hands and face often. Use soap and water.  Caregivers in these groups are at risk for severe illness due to COVID-19:  People 65 years and older  People who live in a nursing home or long-term care facility  People with chronic disease (lung, heart, cancer, diabetes, kidney, liver, immunologic)  People who have a weakened immune system, including those who:  Are in cancer treatment  Take medicine that weakens the immune system, such as corticosteroids  Had a bone marrow or organ transplant  Have an immune deficiency  Have poorly controlled HIV or AIDS  Are obese (body mass index of 40 or higher)  Smoke regularly  Caregivers should wear gloves while washing dishes, handling laundry and cleaning bedrooms and bathrooms.  Use caution when washing and drying laundry: Don't shake dirty laundry and use the warmest water setting that you can.  For more tips on managing your health at home, go to www.cdc.gov/coronavirus/2019-ncov/downloads/10Things.pdf.  How can I take care of myself at home?  Get lots of rest. Drink extra fluids (unless a doctor has told you not to).  Take Tylenol (acetaminophen) for fever or pain. If you have liver or kidney problems, ask your family doctor if it's okay to take Tylenol.   Adults can take either:   650 mg (two 325 mg pills) every 4 to 6 hours, or   1,000 mg (two 500 mg pills) every 8 hours as needed.  Note: Don't take more than 3,000 mg in one day. Acetaminophen is found in many medicines (both prescribed and over-the-counter medicines). Read all labels to be sure you don't take too much.   For children, check the Tylenol bottle for the right dose. The dose is based on the child's age or weight.  If you have other health problems (like cancer, heart failure, an organ transplant or severe kidney disease): Call your specialty clinic if you don't feel better in the next 2 days.  Know when to call 911. Emergency warning signs  include:  Trouble breathing or shortness of breath  Pain or pressure in the chest that doesn't go away  Feeling confused like you haven't felt before, or not being able to wake up  Bluish-colored lips or face  Your doctor may have prescribed a blood thinner medicine. Follow their instructions.  Where can I get more information?  [facility] - About COVID-19:   [email or web address]  CDC - What to Do If You're Sick: www.cdc.gov/coronavirus/2019-ncov/about/steps-when-sick.html  CDC - Ending Home Isolation: www.cdc.gov/coronavirus/2019-ncov/hcp/disposition-in-home-patients.html  CDC - Caring for Someone: www.cdc.gov/coronavirus/2019-ncov/if-you-are-sick/care-for-someone.html  [facility] - Interim Guidance for Hospital Discharge to Home: www.health.[facility].[place]./diseases/coronavirus/hcp/hospdischarge.pdf  Below are the COVID-19 hotlines at the [place] Department of Health ([facility]). Interpreters are available.  For health questions: Call 845-905-3552 or 1-658.247.4363 (7 a.m. to 7 p.m.)  For questions about schools and childcare: Call 783-411-9949 or 1-902.896.7388 (7 a.m. to 7 p.m.)    For informational purposes only. Not to replace the advice of your health care provider. Clinically reviewed by Dr. Wero West.   Copyright   2020 Health system. All rights reserved. [employer] 314095 - REV 01/05/21.        Did you know?      You can schedule a video visit for follow-up appointments as well as future appointments for certain conditions.  Please see the below link.     Video Visits ([email or web address])     If you have not already done so,  I encourage you to sign up for [employer] ([email or web address]).  This will allow you to review your results, securely communicate with a provider, and schedule virtual visits as well.

## 2022-05-28 ENCOUNTER — MYC MEDICAL ADVICE (OUTPATIENT)
Dept: FAMILY MEDICINE | Facility: CLINIC | Age: 62
End: 2022-05-28
Payer: COMMERCIAL

## 2022-06-05 ENCOUNTER — HEALTH MAINTENANCE LETTER (OUTPATIENT)
Age: 62
End: 2022-06-05

## 2022-10-12 ENCOUNTER — TELEPHONE (OUTPATIENT)
Dept: CARDIOLOGY | Facility: CLINIC | Age: 62
End: 2022-10-12

## 2022-10-12 NOTE — TELEPHONE ENCOUNTER
"Patient use to take antibiotics prior to dental work it for a bicuspid valve years ago but the guidelines have change and according to [facility]HA and I also consulted with Arti CABEZAS no need for SBE prophylaxis.  Patient states he was seen today and has a infection and is being treated with Amoxicillin 500mg TID for 7 days.  No additional mediation needed or for SBE prophylaxis needed.  Patient verbalized understanding.      Dr. Eugene Garcia.  I saw the \"after visit summary\" for today about the question I relayed to you via the appointment scheduling desk regarding whether or not I needed pre-med for dental surgery.  But I didn't see an answer as to whether the pre-med was necessary or not.  One additional item -- I was seen again at my dentist's today, and they diagnosed an infection in my gums and are treating it with Amoxicillin 500 mg tid for 7 days.  I don't know if that makes any difference with respect to the pre-med, but I thought I'd better pass that on.  Please let me know if I need pre-med or not.  My surgery is at 9:00 tomorrow morning.  Thank you,    "

## 2022-10-12 NOTE — TELEPHONE ENCOUNTER
[facility] Call Center    Phone Message    May a detailed message be left on voicemail: no     Reason for Call: Other: Joshua called to report he will be having dental surgery tomorrow 10/13/22 at 9 am  and would like clarification if an antibiotic is still needed prior to a dental procedure. If so could June please send a Rx to [facility] [id number] [place], [place] - [address]      Action Taken: Other: [facility]    Travel Screening: Not Applicable

## 2022-10-13 ENCOUNTER — MYC MEDICAL ADVICE (OUTPATIENT)
Dept: CARDIOLOGY | Facility: CLINIC | Age: 62
End: 2022-10-13

## 2022-10-15 ENCOUNTER — HEALTH MAINTENANCE LETTER (OUTPATIENT)
Age: 62
End: 2022-10-15

## 2022-10-17 NOTE — PROGRESS NOTES
HISTORY OF PRESENT ILLNESS:     This is a 62 year old male who follows with Dr Roe at [facility] for congenital aortic stenosis, hypertension, and ascending aorta dilatation.       Mr Henderson's echocardiogram in 1996 showed concern for bicuspid aortic.  A follow up ECHO (2002) showed preserved LVEF and likely bicuspid aortic valve with trivial aortic stenosis.  Overall, he has been free from any significant cardiovascular complaints      A surveillance ECHO (3/8/22) showed LVEF 55-60% without regional wall motion abnormalities, normal RV function, sclerotic aortic valve (cannot exclude bicuspid aortic valve) Ascending aorta 4.2 cm  BP: 168/82  He was started on Lisinopril then.     Subsequently, he developed symptomatic hypotension and his Lisinopril was reduced    He returns today for a 6 month assessment     Mr Henderson feels well and is active without limitations.  His lightheadedness resolved on the lower dose of Lisinopril  He denies any chest pain, shortness of breath, palpitations, orthopnea, or peripheral edema  He checks his BP at home and has not noticed any persistent SBP > 140 mmHg             VITAL SIGNS:  BP: 130/80  Pulse:  86  Weight:  163 lbs (stable)              IMPRESSION AND PLAN:     Possible Bicuspid Aortic Valve  -sclerosis without significant stenosis  -mean gradient 11 mmHg, MIKAEL 1.8 cm2     Hypertension:  -on Lisinopril 2.5 mg  -BP controlled     Mild Ascending Aortic Dilatation  -4.2 cm  -SBP goal < 130  -repeat ECHO (3/2024)    The total time for the visit today was 20 minutes which includes patient visit, reviewing of records, discussion, and placing of orders of the outpatient coordination of cardiovascular care as described.  The level of medical decision making during this visit was of mild  complexity.  Thank you for allowing me to participate in their care.    Orders Placed This Encounter   Procedures     Basic metabolic panel     Follow-Up with Cardiology        Orders Placed This Encounter   Medications     DISCONTD: amoxicillin (AMOXIL) 500 MG tablet     Sig: TAKE 1 TABLET BY MOUTH EVERY 8 HRS       Medications Discontinued During This Encounter   Medication Reason     benzonatate (TESSALON) 200 MG capsule Stopped by Patient     levalbuterol (XOPENEX HFA) 45 MCG/ACT inhaler Stopped by Patient     amoxicillin (AMOXIL) 500 MG tablet Stopped by Patient         Encounter Diagnosis   Name Primary?     Benign essential hypertension        CURRENT MEDICATIONS:  Current Outpatient Medications   Medication Sig Dispense Refill     calcium carbonate (OS-RICHMOND 500 MG [place]. [place]) 500 MG tablet Take 500 mg by mouth 2 times daily       famotidine (PEPCID) 20 MG tablet Take 20 mg by mouth 2 times daily as needed       lisinopril (ZESTRIL) 2.5 MG tablet Take 1 tablet (2.5 mg) by mouth daily 90 tablet 3     multivitamin, therapeutic with minerals (THERA-VIT-M) TABS Take 1 tablet by mouth daily         ALLERGIES     Allergies   Allergen Reactions     Sulfa Drugs      Duricef [Cefadroxil Monohydrate] Rash     Rash         PAST MEDICAL HISTORY:  Past Medical History:   Diagnosis Date     Annular pancreas      Congenital aortic stenosis     mild  bicuspid aortic valve     Epididymitis, bilateral      Gastroesophageal reflux disease without esophagitis     rare     Scoliosis     kyphosis, prior bracing        PAST SURGICAL HISTORY:  Past Surgical History:   Procedure Laterality Date     CARDIAC CATH ARTERIAL      age 8, dx bicuspid aortic valve     SURGICAL HISTORY OF -       excision annular pancreas lesion age 3days       FAMILY HISTORY:  Family History   Problem Relation Age of Onset     Cancer Father          55 of metastatic melanoma     Hypertension Father      Cardiovascular Mother         bradycardia, pacemaker     Hypertension Mother      Diabetes Brother      Cancer - colorectal No family hx of      Prostate Cancer No family hx of        SOCIAL HISTORY:  Social History  "    Socioeconomic History     Marital status:      Spouse name: None     Number of children: None     Years of education: None     Highest education level: None   Tobacco Use     Smoking status: Never     Smokeless tobacco: Never   Substance and Sexual Activity     Alcohol use: No     Drug use: No     Comment: caffeine 1/d     Sexual activity: Yes     Partners: Female       Review of Systems:  Skin:          Eyes:         ENT:         Respiratory:  Negative       Cardiovascular:  Negative      Gastroenterology:        Genitourinary:         Musculoskeletal:         Neurologic:         Psychiatric:         Heme/Lymph/Imm:         Endocrine:           Physical Exam:  Vitals: /80 (BP Location: Right arm, Patient Position: Sitting, Cuff Size: Adult Regular)   Pulse 86   Ht 1.676 m (5' 6\")   Wt 73.9 kg (163 lb)   SpO2 100%   BMI 26.31 kg/m      Constitutional:  cooperative;well nourished        Skin:  warm and dry to the touch          Head:  normocephalic        Eyes:  pupils equal and round        Lymph:      ENT:           Neck:  JVP normal transmitted murmur minimal    Respiratory:  clear to auscultation;normal respiratory excursion         Cardiac: regular rhythm       systolic ejection murmur;early systolic murmur;grade 1     tambour quality s2  pulses full and equal                                        GI:  abdomen soft        Extremities and Muscular Skeletal:  no edema              Neurological:  no gross motor deficits        Psych:  Alert and Oriented x 3          CC  HERIBERTO Romero CNP  [address]  [place] [address]                  "

## 2022-10-18 ENCOUNTER — OFFICE VISIT (OUTPATIENT)
Dept: CARDIOLOGY | Facility: CLINIC | Age: 62
End: 2022-10-18
Attending: NURSE PRACTITIONER
Payer: COMMERCIAL

## 2022-10-18 VITALS
HEART RATE: 86 BPM | SYSTOLIC BLOOD PRESSURE: 130 MMHG | HEIGHT: 66 IN | BODY MASS INDEX: 26.2 KG/M2 | DIASTOLIC BLOOD PRESSURE: 80 MMHG | OXYGEN SATURATION: 100 % | WEIGHT: 163 LBS

## 2022-10-18 DIAGNOSIS — I10 BENIGN ESSENTIAL HYPERTENSION: ICD-10-CM

## 2022-10-18 PROCEDURE — 99213 OFFICE O/P EST LOW 20 MIN: CPT | Performed by: NURSE PRACTITIONER

## 2022-10-18 RX ORDER — AMOXICILLIN 500 MG/1
TABLET, FILM COATED ORAL
COMMUNITY
Start: 2022-10-12 | End: 2022-10-18

## 2022-10-18 NOTE — LETTER
10/18/2022    Paul Leblanc MD  [address] [place] [address]    RE: Joshua ANDINO Santiago       Dear Colleague,     I had the pleasure of seeing Joshua eHnderson in the [facility].  HISTORY OF PRESENT ILLNESS:     This is a 62 year old male who follows with Dr Roe at [facility] for congenital aortic stenosis, hypertension, and ascending aorta dilatation.       Mr Henderson's echocardiogram in 1996 showed concern for bicuspid aortic.  A follow up ECHO (2002) showed preserved LVEF and likely bicuspid aortic valve with trivial aortic stenosis.  Overall, he has been free from any significant cardiovascular complaints      A surveillance ECHO (3/8/22) showed LVEF 55-60% without regional wall motion abnormalities, normal RV function, sclerotic aortic valve (cannot exclude bicuspid aortic valve) Ascending aorta 4.2 cm  BP: 168/82  He was started on Lisinopril then.     Subsequently, he developed symptomatic hypotension and his Lisinopril was reduced    He returns today for a 6 month assessment     Mr Henderson feels well and is active without limitations.  His lightheadedness resolved on the lower dose of Lisinopril  He denies any chest pain, shortness of breath, palpitations, orthopnea, or peripheral edema  He checks his BP at home and has not noticed any persistent SBP > 140 mmHg             VITAL SIGNS:  BP: 130/80  Pulse:  86  Weight:  163 lbs (stable)              IMPRESSION AND PLAN:     Possible Bicuspid Aortic Valve  -sclerosis without significant stenosis  -mean gradient 11 mmHg, MIKAEL 1.8 cm2     Hypertension:  -on Lisinopril 2.5 mg  -BP controlled     Mild Ascending Aortic Dilatation  -4.2 cm  -SBP goal < 130  -repeat ECHO (3/2024)    The total time for the visit today was 20 minutes which includes patient visit, reviewing of records, discussion, and placing of orders of the outpatient coordination of cardiovascular care as described.  The level of medical decision  making during this visit was of mild  complexity.  Thank you for allowing me to participate in their care.    Orders Placed This Encounter   Procedures     Basic metabolic panel     Follow-Up with Cardiology       Orders Placed This Encounter   Medications     DISCONTD: amoxicillin (AMOXIL) 500 MG tablet     Sig: TAKE 1 TABLET BY MOUTH EVERY 8 HRS       Medications Discontinued During This Encounter   Medication Reason     benzonatate (TESSALON) 200 MG capsule Stopped by Patient     levalbuterol (XOPENEX HFA) 45 MCG/ACT inhaler Stopped by Patient     amoxicillin (AMOXIL) 500 MG tablet Stopped by Patient         Encounter Diagnosis   Name Primary?     Benign essential hypertension        CURRENT MEDICATIONS:  Current Outpatient Medications   Medication Sig Dispense Refill     calcium carbonate (OS-RICHMOND 500 MG [place]. [place]) 500 MG tablet Take 500 mg by mouth 2 times daily       famotidine (PEPCID) 20 MG tablet Take 20 mg by mouth 2 times daily as needed       lisinopril (ZESTRIL) 2.5 MG tablet Take 1 tablet (2.5 mg) by mouth daily 90 tablet 3     multivitamin, therapeutic with minerals (THERA-VIT-M) TABS Take 1 tablet by mouth daily         ALLERGIES     Allergies   Allergen Reactions     Sulfa Drugs      Duricef [Cefadroxil Monohydrate] Rash     Rash         PAST MEDICAL HISTORY:  Past Medical History:   Diagnosis Date     Annular pancreas      Congenital aortic stenosis     mild  bicuspid aortic valve     Epididymitis, bilateral      Gastroesophageal reflux disease without esophagitis     rare     Scoliosis     kyphosis, prior bracing        PAST SURGICAL HISTORY:  Past Surgical History:   Procedure Laterality Date     CARDIAC CATH ARTERIAL      age 8, dx bicuspid aortic valve     SURGICAL HISTORY OF -       excision annular pancreas lesion age 3days       FAMILY HISTORY:  Family History   Problem Relation Age of Onset     Cancer Father          55 of metastatic melanoma     Hypertension Father      Cardiovascular  "Mother         bradycardia, pacemaker     Hypertension Mother      Diabetes Brother      Cancer - colorectal No family hx of      Prostate Cancer No family hx of        SOCIAL HISTORY:  Social History     Socioeconomic History     Marital status:      Spouse name: None     Number of children: None     Years of education: None     Highest education level: None   Tobacco Use     Smoking status: Never     Smokeless tobacco: Never   Substance and Sexual Activity     Alcohol use: No     Drug use: No     Comment: caffeine 1/d     Sexual activity: Yes     Partners: Female       Review of Systems:  Skin:          Eyes:         ENT:         Respiratory:  Negative       Cardiovascular:  Negative      Gastroenterology:        Genitourinary:         Musculoskeletal:         Neurologic:         Psychiatric:         Heme/Lymph/Imm:         Endocrine:           Physical Exam:  Vitals: /80 (BP Location: Right arm, Patient Position: Sitting, Cuff Size: Adult Regular)   Pulse 86   Ht 1.676 m (5' 6\")   Wt 73.9 kg (163 lb)   SpO2 100%   BMI 26.31 kg/m      Constitutional:  cooperative;well nourished        Skin:  warm and dry to the touch          Head:  normocephalic        Eyes:  pupils equal and round        Lymph:      ENT:           Neck:  JVP normal transmitted murmur minimal    Respiratory:  clear to auscultation;normal respiratory excursion         Cardiac: regular rhythm       systolic ejection murmur;early systolic murmur;grade 1     tambour quality s2  pulses full and equal                                        GI:  abdomen soft        Extremities and Muscular Skeletal:  no edema              Neurological:  no gross motor deficits        Psych:  Alert and Oriented x 3          CC  HERIBERTO Romero CNP  [address]  [place] [address]    Thank you for allowing me to participate in the care of your patient.      Sincerely,     HERIBERTO Owens CNP     [facility] " [facility]

## 2023-02-13 ENCOUNTER — TELEPHONE (OUTPATIENT)
Dept: UROLOGY | Facility: CLINIC | Age: 63
End: 2023-02-13

## 2023-02-13 NOTE — TELEPHONE ENCOUNTER
[facility] Call Center    Phone Message    May a detailed message be left on voicemail: yes     Reason for Call: Patient last seen with Dr. Everett on 3/17/2020 for epididymitis .  Writer has scheduled patient for first available on 4/27/23 and added patient to wait list. Per patient, he would like to be seen sooner, such as tomorrow as his condition is acute. Please reach out to patient.    Action Taken: Message routed to:  Other: Urology    Travel Screening: Not Applicable

## 2023-02-16 ENCOUNTER — OFFICE VISIT (OUTPATIENT)
Dept: FAMILY MEDICINE | Facility: CLINIC | Age: 63
End: 2023-02-16
Payer: COMMERCIAL

## 2023-02-16 VITALS
BODY MASS INDEX: 26.63 KG/M2 | DIASTOLIC BLOOD PRESSURE: 76 MMHG | RESPIRATION RATE: 18 BRPM | WEIGHT: 165.7 LBS | HEART RATE: 70 BPM | OXYGEN SATURATION: 98 % | HEIGHT: 66 IN | TEMPERATURE: 96.9 F | SYSTOLIC BLOOD PRESSURE: 131 MMHG

## 2023-02-16 DIAGNOSIS — N45.1 ACUTE EPIDIDYMITIS: Primary | ICD-10-CM

## 2023-02-16 LAB
ALBUMIN UR-MCNC: NEGATIVE MG/DL
APPEARANCE UR: CLEAR
BACTERIA #/AREA URNS HPF: ABNORMAL /HPF
BILIRUB UR QL STRIP: NEGATIVE
COLOR UR AUTO: YELLOW
GLUCOSE UR STRIP-MCNC: NEGATIVE MG/DL
HGB UR QL STRIP: ABNORMAL
KETONES UR STRIP-MCNC: NEGATIVE MG/DL
LEUKOCYTE ESTERASE UR QL STRIP: NEGATIVE
NITRATE UR QL: NEGATIVE
PH UR STRIP: 7 [PH] (ref 5–7)
RBC #/AREA URNS AUTO: ABNORMAL /HPF
SP GR UR STRIP: 1.02 (ref 1–1.03)
SQUAMOUS #/AREA URNS AUTO: ABNORMAL /LPF
UROBILINOGEN UR STRIP-ACNC: 0.2 E.U./DL
WBC #/AREA URNS AUTO: ABNORMAL /HPF

## 2023-02-16 PROCEDURE — 99213 OFFICE O/P EST LOW 20 MIN: CPT

## 2023-02-16 PROCEDURE — 81001 URINALYSIS AUTO W/SCOPE: CPT

## 2023-02-16 RX ORDER — DOXYCYCLINE 100 MG/1
100 CAPSULE ORAL 2 TIMES DAILY
Qty: 84 CAPSULE | Refills: 0 | Status: SHIPPED | OUTPATIENT
Start: 2023-02-16 | End: 2023-03-30

## 2023-02-16 RX ORDER — ALBUTEROL SULFATE 90 UG/1
2 AEROSOL, METERED RESPIRATORY (INHALATION) EVERY 6 HOURS
COMMUNITY
Start: 2022-05-16 | End: 2023-06-09

## 2023-02-16 NOTE — PATIENT INSTRUCTIONS
Epidymitis  -eliminate caffeine for the time being to see how helpful this would be.  -avoid cranberry juice.  -try and avoid really spicy food if this is causes significant problems in this area.  -wear good supporting underpants rather than boxer shorts.  -warm baths in the evening which may be quite helpful.  -anti-inflammatory medication as needed.    Doxycycline for 6 weeks twice a day  -let us know if not improving.     UA today for rule out UTI

## 2023-02-16 NOTE — PROGRESS NOTES
"  Assessment & Plan     (N45.1) Acute epididymitis  (primary encounter diagnosis)  Comment: patient history and exam suggestive of epididymitis. Will treat with Doxycycline as he has had good success with this in the past. UA to rule in/out UTI as well. Risks, benefits, side effects and intended purposes of Doxycycline discussed.  Educated patient on lifestyle changes that can be made and further interventions to help with discomfort, see patient instructions. Instructed patient to notify provider if not seeing an improvement with Doxycycline regimen. At that point could consider another antibiotic such as levofloxacin. Patient agreeable with plan of care and at this point patient will follow up as needed unless acute concerns arise in the meantime.  Plan: UA Macro with Reflex to Micro and Culture - lab        collect, doxycycline monohydrate (MONODOX) 100         MG capsule         BMI:   Estimated body mass index is 26.74 kg/m  as calculated from the following:    Height as of this encounter: 1.676 m (5' 6\").    Weight as of this encounter: 75.2 kg (165 lb 11.2 oz).     No follow-ups on file.    HERIBERTO [facility]    Chey Lewis is a 62 year old, presenting for the following health issues:    History of Present Illness       Reason for visit:  Epididymitis/Orchitis    He eats 2-3 servings of fruits and vegetables daily.He consumes 0 sweetened beverage(s) daily.He exercises with enough effort to increase his heart rate 10 to 19 minutes per day.  He exercises with enough effort to increase his heart rate 5 days per week.   He is taking medications regularly.     Concern - Epididymitis  Onset: x 1 week  Description: Uncomfortable while sitting  Intensity: moderate  Progression of Symptoms:  worsening  Previous history of similar problem: Yes  Therapies tried and outcome:  none     -Diffuse scrotal pain of moderate intensity.  -Pain reminiscent of previous epididymitis " "episodes.  -Last episode in March and December of 2020, was treated with Doxycycline with success.  -Has cut caffiene down  -Has not cut spicey foods out  -Patient in monogamous relationship with wife, does not partake in anal intercourse.   -Denies fevers, chills, myalgias     Review of Systems   Constitutional, cardiovascular, pulmonary, gi and gu systems are negative, except as otherwise noted.      Objective    /76 (BP Location: Right arm, Patient Position: Sitting, Cuff Size: Adult Regular)   Pulse 70   Temp 96.9  F (36.1  C) (Temporal)   Resp 18   Ht 1.676 m (5' 6\")   Wt 75.2 kg (165 lb 11.2 oz)   SpO2 98%   BMI 26.74 kg/m    Body mass index is 26.74 kg/m .  Physical Exam  Constitutional:       General: He is not in acute distress.     Appearance: Normal appearance. He is not ill-appearing.   Cardiovascular:      Rate and Rhythm: Normal rate.   Pulmonary:      Effort: Pulmonary effort is normal.   Abdominal:      Hernia: There is no hernia in the left inguinal area or right inguinal area.   Genitourinary:     Testes: Cremasteric reflex is present.         Right: Tenderness and swelling present. Mass, testicular hydrocele or varicocele not present. Right testis is descended. Cremasteric reflex is present.          Left: Tenderness and swelling present. Mass, testicular hydrocele or varicocele not present. Left testis is descended. Cremasteric reflex is present.       Epididymis:      Right: Tenderness present.      Left: Tenderness present.      Comments: Tenderness to palpation on posterior aspect of scrotum/testes.  Skin:     General: Skin is warm.   Neurological:      Mental Status: He is alert.   Psychiatric:         Mood and Affect: Mood normal.         Behavior: Behavior normal.         Thought Content: Thought content normal.         Judgment: Judgment normal.                 "

## 2023-04-04 DIAGNOSIS — I10 BENIGN ESSENTIAL HYPERTENSION: ICD-10-CM

## 2023-04-04 RX ORDER — LISINOPRIL 2.5 MG/1
2.5 TABLET ORAL DAILY
Qty: 90 TABLET | Refills: 0 | Status: SHIPPED | OUTPATIENT
Start: 2023-04-04 | End: 2023-06-29

## 2023-04-04 NOTE — TELEPHONE ENCOUNTER
[facility] Refill Guideline reviewed.  Medication meets criteria for refill. BMP scheduled for 4/24/23.

## 2023-04-23 NOTE — PROGRESS NOTES
"HISTORY OF PRESENT ILLNESS:     This is a 62 year old male who follows with Dr Roe at [facility] for congenital aortic stenosis, hypertension, and ascending aorta dilatation.       Mr Henderson's echocardiogram in 1996 showed concern for bicuspid aortic.  A follow up ECHO (2002) showed preserved LVEF, likely bicuspid aortic valve with trivial aortic stenosis.  Overall, he has been free from any significant cardiovascular complaints      A surveillance ECHO (3/8/22) showed LVEF 55-60% without regional wall motion abnormalities, normal RV function, sclerotic aortic valve (cannot exclude bicuspid aortic valve) Ascending aorta 4.2 cm  BP: 168/82  He was started on Lisinopril then.    He returns today for a 6 month assessment and review of recent ECHO    ECHO (3/8/23) LVEF 55% without regional wall motion abnormalities, normal RV function, sclerotic appearing aortic valve, could not exclude bicuspid.  No mention of significant stenosis or regurgitation  (mean gradient 11 mmHg) Mild ascending aorta dilatation (4.0 cm)    Mr Henderson is active but does not participate in any formal exercise.  He noted an episode of throat tightness after climbing 4 flights of stairs recently.  This quickly went away with rest  Otherwise, he has not had any other chest pain or throat tightness.  He denies shortness of breath, palpitations, orthopnea, or peripheral edema.  He feels better the lower dose of Lisinopril and is not as tired.   When he has checked his BP at home, his SBP is staying < 140 mmHg           VITAL SIGNS:  BP:  114/70  Pulse:  90  Weight:  164 lbs (BMI: 26)         IMPRESSION AND PLAN:     Congenital Aortic Valve Disease, possible bicuspid  -sclerosis without significant stenosis  -mean gradient 11 mmHg, MIKAEL 1.8 cm2    Single episode of  Exertional \"throat tightness\"  -will arrange stress ECHO      Hypertension:  -on Lisinopril 2.5 mg  (some lightheadedness with higher dose)  -BP controlled  -he will call " with BP > 130/90  We could consider increasing Lisinopril to 2.5 mg BID if need     Mild Ascending Aortic Dilatation  -4.0 cm  -SBP goal < 130    The total time for the visit today was 30 minutes which includes patient visit, reviewing of records, discussion, and placing of orders of the outpatient coordination of cardiovascular care as described.  The level of medical decision making during this visit was of moderate complexity.  Thank you for allowing me to participate in their care.    Orders Placed This Encounter   Procedures     Follow-Up with Cardiology     Exercise Stress Echocardiogram       No orders of the defined types were placed in this encounter.      There are no discontinued medications.      Encounter Diagnoses   Name Primary?     Benign essential hypertension      Bicuspid aortic valve Yes     Chest pain, unspecified type        CURRENT MEDICATIONS:  Current Outpatient Medications   Medication Sig Dispense Refill     calcium carbonate (OS-RICHMOND 500 MG [place]. [place]) 500 MG tablet Take 500 mg by mouth 2 times daily       famotidine (PEPCID) 20 MG tablet Take 20 mg by mouth 2 times daily as needed       lisinopril (ZESTRIL) 2.5 MG tablet Take 1 tablet (2.5 mg) by mouth daily 90 tablet 0     multivitamin, therapeutic with minerals (THERA-VIT-M) TABS Take 1 tablet by mouth daily       albuterol (PROAIR HFA/PROVENTIL HFA/VENTOLIN HFA) 108 (90 Base) MCG/ACT inhaler Inhale 2 puffs into the lungs every 6 hours (Patient not taking: Reported on 4/25/2023)         ALLERGIES     Allergies   Allergen Reactions     Sulfa Drugs      Duricef [Cefadroxil Monohydrate] Rash     Rash         PAST MEDICAL HISTORY:  Past Medical History:   Diagnosis Date     Annular pancreas      Congenital aortic stenosis     mild  bicuspid aortic valve     Epididymitis, bilateral      Gastroesophageal reflux disease without esophagitis     rare     Scoliosis     kyphosis, prior bracing        PAST SURGICAL HISTORY:  Past Surgical History:  "  Procedure Laterality Date     CARDIAC CATH ARTERIAL      age 8, dx bicuspid aortic valve     SURGICAL HISTORY OF -       excision annular pancreas lesion age 3days       FAMILY HISTORY:  Family History   Problem Relation Age of Onset     Cancer Father          55 of metastatic melanoma     Hypertension Father      Cardiovascular Mother         bradycardia, pacemaker     Hypertension Mother      Diabetes Brother      Cancer - colorectal No family hx of      Prostate Cancer No family hx of        SOCIAL HISTORY:  Social History     Socioeconomic History     Marital status:      Spouse name: None     Number of children: None     Years of education: None     Highest education level: None   Tobacco Use     Smoking status: Never     Smokeless tobacco: Never   Vaping Use     Vaping status: Never Used   Substance and Sexual Activity     Alcohol use: No     Drug use: No     Comment: caffeine 1/d     Sexual activity: Yes     Partners: Female       Review of Systems:  Skin:          Eyes:         ENT:         Respiratory:  Negative       Cardiovascular:    Positive for;fatigue    Gastroenterology:        Genitourinary:         Musculoskeletal:         Neurologic:         Psychiatric:         Heme/Lymph/Imm:         Endocrine:           Physical Exam:  Vitals: /70 (BP Location: Right arm, Patient Position: Sitting, Cuff Size: Adult Regular)   Pulse 90   Ht 1.676 m (5' 6\")   Wt 74.4 kg (164 lb)   SpO2 96%   BMI 26.47 kg/m      Constitutional:  cooperative;well nourished        Skin:  warm and dry to the touch          Head:  normocephalic        Eyes:  pupils equal and round        Lymph:      ENT:           Neck:  JVP normal transmitted murmur minimal    Respiratory:  clear to auscultation;normal respiratory excursion         Cardiac: regular rhythm       systolic ejection murmur;early systolic murmur;grade 1     tambour quality s2  pulses full and equal                                        GI:  " abdomen soft        Extremities and Muscular Skeletal:  no edema              Neurological:  no gross motor deficits        Psych:  Alert and Oriented x 3          CC  HERIBERTO Romero CNP  [address]  [place] [id number]

## 2023-04-24 ENCOUNTER — LAB (OUTPATIENT)
Dept: LAB | Facility: CLINIC | Age: 63
End: 2023-04-24
Payer: COMMERCIAL

## 2023-04-24 DIAGNOSIS — I10 BENIGN ESSENTIAL HYPERTENSION: ICD-10-CM

## 2023-04-24 LAB
ANION GAP SERPL CALCULATED.3IONS-SCNC: 8 MMOL/L (ref 7–15)
BUN SERPL-MCNC: 15 MG/DL (ref 8–23)
CALCIUM SERPL-MCNC: 9.2 MG/DL (ref 8.8–10.2)
CHLORIDE SERPL-SCNC: 105 MMOL/L (ref 98–107)
CREAT SERPL-MCNC: 0.9 MG/DL (ref 0.67–1.17)
DEPRECATED HCO3 PLAS-SCNC: 28 MMOL/L (ref 22–29)
GFR SERPL CREATININE-BSD FRML MDRD: >90 ML/MIN/1.73M2
GLUCOSE SERPL-MCNC: 74 MG/DL (ref 70–99)
POTASSIUM SERPL-SCNC: 4 MMOL/L (ref 3.4–5.3)
SODIUM SERPL-SCNC: 141 MMOL/L (ref 136–145)

## 2023-04-24 PROCEDURE — 80048 BASIC METABOLIC PNL TOTAL CA: CPT | Performed by: NURSE PRACTITIONER

## 2023-04-24 PROCEDURE — 36415 COLL VENOUS BLD VENIPUNCTURE: CPT | Performed by: NURSE PRACTITIONER

## 2023-04-25 ENCOUNTER — OFFICE VISIT (OUTPATIENT)
Dept: CARDIOLOGY | Facility: CLINIC | Age: 63
End: 2023-04-25
Attending: NURSE PRACTITIONER
Payer: COMMERCIAL

## 2023-04-25 VITALS
BODY MASS INDEX: 26.36 KG/M2 | SYSTOLIC BLOOD PRESSURE: 114 MMHG | DIASTOLIC BLOOD PRESSURE: 70 MMHG | HEIGHT: 66 IN | HEART RATE: 90 BPM | WEIGHT: 164 LBS | OXYGEN SATURATION: 96 %

## 2023-04-25 DIAGNOSIS — R07.9 CHEST PAIN, UNSPECIFIED TYPE: ICD-10-CM

## 2023-04-25 DIAGNOSIS — I10 BENIGN ESSENTIAL HYPERTENSION: ICD-10-CM

## 2023-04-25 DIAGNOSIS — Q23.81 BICUSPID AORTIC VALVE: Primary | ICD-10-CM

## 2023-04-25 PROCEDURE — 99214 OFFICE O/P EST MOD 30 MIN: CPT | Performed by: NURSE PRACTITIONER

## 2023-04-25 NOTE — PATIENT INSTRUCTIONS
Arrange your stress test, we will call you with the results  Call with persistent BP> 130/90  Increase your water intake    It was a pleasure seeing you today     Please do not hesitate to call my nurse team with any questions or concerns:  [phone]    Scheduling number:  [phone]    HERIBERTO Anders, CNP

## 2023-04-25 NOTE — LETTER
4/25/2023    Paul Leblanc MD  [address] [place] [address]    RE: Joshua Martinn       Dear Colleague,     I had the pleasure of seeing Joshua Henderson in the [facility].  HISTORY OF PRESENT ILLNESS:     This is a 62 year old male who follows with Dr Roe at [facility] for congenital aortic stenosis, hypertension, and ascending aorta dilatation.       Mr Henderson's echocardiogram in 1996 showed concern for bicuspid aortic.  A follow up ECHO (2002) showed preserved LVEF, likely bicuspid aortic valve with trivial aortic stenosis.  Overall, he has been free from any significant cardiovascular complaints      A surveillance ECHO (3/8/22) showed LVEF 55-60% without regional wall motion abnormalities, normal RV function, sclerotic aortic valve (cannot exclude bicuspid aortic valve) Ascending aorta 4.2 cm  BP: 168/82  He was started on Lisinopril then.    He returns today for a 6 month assessment and review of recent ECHO    ECHO (3/8/23) LVEF 55% without regional wall motion abnormalities, normal RV function, sclerotic appearing aortic valve, could not exclude bicuspid.  No mention of significant stenosis or regurgitation  (mean gradient 11 mmHg) Mild ascending aorta dilatation (4.0 cm)    Mr Henderson is active but does not participate in any formal exercise.  He noted an episode of throat tightness after climbing 4 flights of stairs recently.  This quickly went away with rest  Otherwise, he has not had any other chest pain or throat tightness.  He denies shortness of breath, palpitations, orthopnea, or peripheral edema.  He feels better the lower dose of Lisinopril and is not as tired.   When he has checked his BP at home, his SBP is staying < 140 mmHg           VITAL SIGNS:  BP:  114/70  Pulse:  90  Weight:  164 lbs (BMI: 26)         IMPRESSION AND PLAN:     Congenital Aortic Valve Disease, possible bicuspid  -sclerosis without significant stenosis  -mean gradient 11  "mmHg, MIKAEL 1.8 cm2    Single episode of  Exertional \"throat tightness\"  -will arrange stress ECHO      Hypertension:  -on Lisinopril 2.5 mg  (some lightheadedness with higher dose)  -BP controlled  -he will call with BP > 130/90  We could consider increasing Lisinopril to 2.5 mg BID if need     Mild Ascending Aortic Dilatation  -4.0 cm  -SBP goal < 130    The total time for the visit today was 30 minutes which includes patient visit, reviewing of records, discussion, and placing of orders of the outpatient coordination of cardiovascular care as described.  The level of medical decision making during this visit was of moderate complexity.  Thank you for allowing me to participate in their care.    Orders Placed This Encounter   Procedures    Follow-Up with Cardiology    Exercise Stress Echocardiogram       No orders of the defined types were placed in this encounter.      There are no discontinued medications.      Encounter Diagnoses   Name Primary?    Benign essential hypertension     Bicuspid aortic valve Yes    Chest pain, unspecified type        CURRENT MEDICATIONS:  Current Outpatient Medications   Medication Sig Dispense Refill    calcium carbonate (OS-RICHMOND 500 MG [place]. [place]) 500 MG tablet Take 500 mg by mouth 2 times daily      famotidine (PEPCID) 20 MG tablet Take 20 mg by mouth 2 times daily as needed      lisinopril (ZESTRIL) 2.5 MG tablet Take 1 tablet (2.5 mg) by mouth daily 90 tablet 0    multivitamin, therapeutic with minerals (THERA-VIT-M) TABS Take 1 tablet by mouth daily      albuterol (PROAIR HFA/PROVENTIL HFA/VENTOLIN HFA) 108 (90 Base) MCG/ACT inhaler Inhale 2 puffs into the lungs every 6 hours (Patient not taking: Reported on 4/25/2023)         ALLERGIES     Allergies   Allergen Reactions    Sulfa Drugs     Duricef [Cefadroxil Monohydrate] Rash     Rash         PAST MEDICAL HISTORY:  Past Medical History:   Diagnosis Date    Annular pancreas     Congenital aortic stenosis     mild  bicuspid aortic " "valve    Epididymitis, bilateral     Gastroesophageal reflux disease without esophagitis     rare    Scoliosis     kyphosis, prior bracing        PAST SURGICAL HISTORY:  Past Surgical History:   Procedure Laterality Date    CARDIAC CATH ARTERIAL      age 8, dx bicuspid aortic valve    SURGICAL HISTORY OF -       excision annular pancreas lesion age 3days       FAMILY HISTORY:  Family History   Problem Relation Age of Onset    Cancer Father          55 of metastatic melanoma    Hypertension Father     Cardiovascular Mother         bradycardia, pacemaker    Hypertension Mother     Diabetes Brother     Cancer - colorectal No family hx of     Prostate Cancer No family hx of        SOCIAL HISTORY:  Social History     Socioeconomic History    Marital status:      Spouse name: None    Number of children: None    Years of education: None    Highest education level: None   Tobacco Use    Smoking status: Never    Smokeless tobacco: Never   Vaping Use    Vaping status: Never Used   Substance and Sexual Activity    Alcohol use: No    Drug use: No     Comment: caffeine 1/d    Sexual activity: Yes     Partners: Female       Review of Systems:  Skin:          Eyes:         ENT:         Respiratory:  Negative       Cardiovascular:    Positive for;fatigue    Gastroenterology:        Genitourinary:         Musculoskeletal:         Neurologic:         Psychiatric:         Heme/Lymph/Imm:         Endocrine:           Physical Exam:  Vitals: /70 (BP Location: Right arm, Patient Position: Sitting, Cuff Size: Adult Regular)   Pulse 90   Ht 1.676 m (5' 6\")   Wt 74.4 kg (164 lb)   SpO2 96%   BMI 26.47 kg/m      Constitutional:  cooperative;well nourished        Skin:  warm and dry to the touch          Head:  normocephalic        Eyes:  pupils equal and round        Lymph:      ENT:           Neck:  JVP normal transmitted murmur minimal    Respiratory:  clear to auscultation;normal respiratory excursion     "     Cardiac: regular rhythm       systolic ejection murmur;early systolic murmur;grade 1     tambour quality s2  pulses full and equal                                        GI:  abdomen soft        Extremities and Muscular Skeletal:  no edema              Neurological:  no gross motor deficits        Psych:  Alert and Oriented x 3          CC  HERIBERTO Romero CNP  [address]  [place] [address]      Thank you for allowing me to participate in the care of your patient.      Sincerely,     HERIBERTO Owens CNP     [facility]

## 2023-05-10 ENCOUNTER — HOSPITAL ENCOUNTER (OUTPATIENT)
Dept: CARDIOLOGY | Facility: CLINIC | Age: 63
Discharge: HOME | End: 2023-05-10
Attending: NURSE PRACTITIONER | Admitting: NURSE PRACTITIONER
Payer: COMMERCIAL

## 2023-05-10 DIAGNOSIS — R07.9 CHEST PAIN, UNSPECIFIED TYPE: ICD-10-CM

## 2023-05-10 DIAGNOSIS — Q23.81 BICUSPID AORTIC VALVE: ICD-10-CM

## 2023-05-10 PROCEDURE — 93321 DOPPLER ECHO F-UP/LMTD STD: CPT | Mod: TC

## 2023-05-10 PROCEDURE — 93325 DOPPLER ECHO COLOR FLOW MAPG: CPT | Mod: TC

## 2023-05-10 PROCEDURE — 93321 DOPPLER ECHO F-UP/LMTD STD: CPT | Mod: 26 | Performed by: INTERNAL MEDICINE

## 2023-05-10 PROCEDURE — 93350 STRESS TTE ONLY: CPT | Mod: 26 | Performed by: INTERNAL MEDICINE

## 2023-05-10 PROCEDURE — 93325 DOPPLER ECHO COLOR FLOW MAPG: CPT | Mod: 26 | Performed by: INTERNAL MEDICINE

## 2023-05-10 PROCEDURE — 93018 CV STRESS TEST I&R ONLY: CPT | Performed by: INTERNAL MEDICINE

## 2023-05-10 PROCEDURE — 93016 CV STRESS TEST SUPVJ ONLY: CPT | Performed by: INTERNAL MEDICINE

## 2023-05-10 PROCEDURE — 255N000002 HC RX 255 OP 636: Performed by: NURSE PRACTITIONER

## 2023-05-10 RX ADMIN — HUMAN ALBUMIN MICROSPHERES AND PERFLUTREN 3 ML: 10; .22 INJECTION, SOLUTION INTRAVENOUS at 10:35

## 2023-05-11 NOTE — RESULT ENCOUNTER NOTE
Results and recommendations routed Via My Chart with call back information if needed   Stress test reviewed  No evidence of stress-induced ischemia  Known bicuspid aortic valve without stenosis or regurgitation.  Below average exercise capacity  Pls let him know  Continue medical management

## 2023-06-09 ENCOUNTER — OFFICE VISIT (OUTPATIENT)
Dept: INTERNAL MEDICINE | Facility: CLINIC | Age: 63
End: 2023-06-09
Payer: COMMERCIAL

## 2023-06-09 VITALS
DIASTOLIC BLOOD PRESSURE: 70 MMHG | SYSTOLIC BLOOD PRESSURE: 114 MMHG | WEIGHT: 157 LBS | TEMPERATURE: 97.7 F | OXYGEN SATURATION: 96 % | RESPIRATION RATE: 16 BRPM | BODY MASS INDEX: 25.34 KG/M2 | HEART RATE: 83 BPM

## 2023-06-09 DIAGNOSIS — M25.511 PAIN IN JOINT OF RIGHT SHOULDER: Primary | ICD-10-CM

## 2023-06-09 PROCEDURE — 99213 OFFICE O/P EST LOW 20 MIN: CPT

## 2023-06-09 NOTE — PROGRESS NOTES
"  Assessment & Plan     (M25.511) Pain in joint of right shoulder  (primary encounter diagnosis)  Comment: Pt noticed small raised bump over R shoulder yesterday. He said it only hurts when he relaxes deeply into a chair when sitting. He iced it yesterday and took tylenol. He denies any pain when not relaxing against a chair. Upon exam today I do not appreciate any lesions or palpable masses. We discussed it was likely a musculoskeletal strain and he can use tylenol PRN but this should resolve.            BMI:   Estimated body mass index is 25.34 kg/m  as calculated from the following:    Height as of 4/25/23: 1.676 m (5' 6\").    Weight as of this encounter: 71.2 kg (157 lb).       HERIBERTO Jewell CNP  [facility] 62 [facility], presenting for the following health issues:  [facility]        6/9/2023    11:19 AM   Additional Questions   Roomed by Aracelis Carmichael    History of Present Illness       Reason for visit:  Discomfort in right shoulder blade area; small \"bump\" area  Symptom onset:  1-3 days ago  Symptoms include:  Discomfort in right shoulder blade area; small \"bump\" area  Symptom intensity:  Mild  Symptom progression:  Staying the same  Had these symptoms before:  No  What makes it worse:  Pressing against the affected area  What makes it better:  Tylenol, ice pack    He eats 2-3 servings of fruits and vegetables daily.He consumes 0 sweetened beverage(s) daily.He exercises with enough effort to increase his heart rate 9 or less minutes per day.  He exercises with enough effort to increase his heart rate 3 or less days per week.   He is taking medications regularly.    Objective    /70   Pulse 83   Temp 97.7  F (36.5  C) (Oral)   Resp 16   Wt 71.2 kg (157 lb)   SpO2 96%   BMI 25.34 kg/m    Body mass index is 25.34 kg/m .        Physical Exam  Constitutional:       General: She is not in acute distress.     Appearance: Normal appearance. She is not " Subjective


Remarks


Patient seen in his room with her staff, chart review, patient compliant 

medications, patient discussed with nurse.  Patient states is somewhat calmer 

the voices are sign to diminish the not as threatening or intrusive.  Is able 

to denies suicidality at this time.  For now continue treatment no change 

consider discharge on Monday 4/90 peak continues to improve





Review of Systems


Except as stated in HPI:  all other systems reviewed are Neg





Mental Status Examination


Appearance:  Disheveled


Consciousness:  Alert


Orientation:  Person, Place, Date/Time


Motor Activity:  Other (unable to ascertain patient laying down)


Speech:  Unremarkable


Language:  Adequate


Fund of Knowledge:  Adequate


Attention and Concentration:  Other (fair)


Memory:  Unremarkable (fair)


Mood:  Other


Affect:  Other (euthymic to somewhat restricted)


Thought Process & Associations:  Other (decreased range and intensity)


Thought Content:  Bizarre thinking


Hallucination Type:  Auditory


Delusion Type:  Paranoid (mild)


Suicidal Ideation:  No (denies this time)


Suicidal Plan:  No


Suicidal Intention:  No


Homicidal Ideation:  No


Homicidal Plan:  No


Homicidal Intention:  No


Insight:  Fair


Judgment:  Adequate (they are)





Results


Vitals/IOs





Vital Signs








  Date Time  Temp Pulse Resp B/P (MAP) Pulse Ox O2 Delivery O2 Flow Rate FiO2


 


4/6/18 05:59 97.4 53 18 114/62 (79) 97   


 


4/3/18 12:09      Room Air  











Assessment & Plan


Problem List:  


(1) Schizophrenia, paranoid, chronic


ICD Codes:  F20.0 - Paranoid schizophrenia


Status:  Acute


Assessment & Plan


Estimated LOS:  days patient continues psychotic though improving, the voices 

are somewhat less intrusive and intense.  Compliant medication


Justification for Cont. Inpt.


At this time patient decompensated placed a lower level of care


Discharge Planning


Protocol return home to [facility] after the weekend


Request HC Surrog/Guard Advoc?:  No











Edgardo Eli MD Apr 6, 2018 14:37 ill-appearing, toxic-appearing or diaphoretic.   HENT:      Head: Normocephalic and atraumatic.   Eyes:      Conjunctiva/sclera: Conjunctivae normal.   Cardiovascular:      Rate and Rhythm: Normal rate and regular rhythm.      Heart sounds: Normal heart sounds.   Pulmonary:      Effort: Pulmonary effort is normal.      Breath sounds: Normal breath sounds.   Skin:     General: Skin is warm and dry.   Neurological:      Mental Status: She is alert and oriented to person, place, and time.   Psychiatric:         Mood and Affect: Mood normal.         Behavior: Behavior normal.         Thought Content: Thought content normal.         Judgment: Judgment normal.

## 2023-06-11 ENCOUNTER — HEALTH MAINTENANCE LETTER (OUTPATIENT)
Age: 63
End: 2023-06-11

## 2023-06-29 ENCOUNTER — MYC REFILL (OUTPATIENT)
Dept: CARDIOLOGY | Facility: CLINIC | Age: 63
End: 2023-06-29
Payer: COMMERCIAL

## 2023-06-29 DIAGNOSIS — I10 BENIGN ESSENTIAL HYPERTENSION: ICD-10-CM

## 2023-06-29 RX ORDER — LISINOPRIL 2.5 MG/1
2.5 TABLET ORAL DAILY
Qty: 90 TABLET | Refills: 1 | Status: SHIPPED | OUTPATIENT
Start: 2023-06-29 | End: 2023-12-21

## 2023-06-29 NOTE — TELEPHONE ENCOUNTER
[facility] Refill Guideline reviewed.  Medication meets criteria for refill.     Alonzo Rob RN

## 2023-10-13 ENCOUNTER — E-VISIT (OUTPATIENT)
Dept: URGENT CARE | Facility: CLINIC | Age: 63
End: 2023-10-13
Payer: COMMERCIAL

## 2023-10-13 ENCOUNTER — LAB (OUTPATIENT)
Dept: LAB | Facility: CLINIC | Age: 63
End: 2023-10-13
Attending: PHYSICIAN ASSISTANT
Payer: COMMERCIAL

## 2023-10-13 DIAGNOSIS — J02.9 ACUTE PHARYNGITIS, UNSPECIFIED ETIOLOGY: ICD-10-CM

## 2023-10-13 DIAGNOSIS — J02.9 ACUTE PHARYNGITIS, UNSPECIFIED ETIOLOGY: Primary | ICD-10-CM

## 2023-10-13 LAB
DEPRECATED S PYO AG THROAT QL EIA: NEGATIVE
GROUP A STREP BY PCR: NOT DETECTED
SARS-COV-2 RNA RESP QL NAA+PROBE: NEGATIVE

## 2023-10-13 PROCEDURE — 87651 STREP A DNA AMP PROBE: CPT

## 2023-10-13 PROCEDURE — 99421 OL DIG E/M SVC 5-10 MIN: CPT | Performed by: PHYSICIAN ASSISTANT

## 2023-10-13 PROCEDURE — 87635 SARS-COV-2 COVID-19 AMP PRB: CPT

## 2023-10-13 NOTE — PATIENT INSTRUCTIONS
Sore Throat: Care Instructions  Overview     Infection by bacteria or a virus causes most sore throats. Cigarette smoke, dry air, air pollution, allergies, and yelling can also cause a sore throat. Sore throats can be painful and annoying. Fortunately, most sore throats go away on their own. If you have a bacterial infection, your doctor may prescribe antibiotics.  Follow-up care is a key part of your treatment and safety. Be sure to make and go to all appointments, and call your doctor if you are having problems. It's also a good idea to know your test results and keep a list of the medicines you take.  How can you care for yourself at home?  If your doctor prescribed antibiotics, take them as directed. Do not stop taking them just because you feel better. You need to take the full course of antibiotics.  Gargle with warm salt water several times a day to help reduce swelling and relieve pain. Mix 1/2 teaspoon of salt in 1 cup of warm water.  Take an over-the-counter pain medicine, such as acetaminophen (Tylenol), ibuprofen (Advil, Motrin), or naproxen (Aleve). Read and follow all instructions on the label.  Be careful when taking over-the-counter cold or flu medicines and Tylenol at the same time. Many of these medicines have acetaminophen, which is Tylenol. Read the labels to make sure that you are not taking more than the recommended dose. Too much acetaminophen (Tylenol) can be harmful.  Drink plenty of fluids. Fluids may help soothe an irritated throat. Hot fluids, such as tea or soup, may help decrease throat pain.  Use over-the-counter throat lozenges to soothe pain. Regular cough drops or hard candy may also help. These should not be given to young children because of the risk of choking.  Do not smoke or allow others to smoke around you. If you need help quitting, talk to your doctor about stop-smoking programs and medicines. These can increase your chances of quitting for good.  Use a vaporizer or  "humidifier to add moisture to your bedroom. Follow the directions for cleaning the machine.  When should you call for help?   Call your doctor now or seek immediate medical care if:    You have trouble breathing.     Your sore throat gets much worse on one side.     You have new or worse trouble swallowing.     You have a new or higher fever.   Watch closely for changes in your health, and be sure to contact your doctor if you do not get better as expected.  Where can you learn more?  Go to https://www.[employer].net/patiented  Enter U420 in the search box to learn more about \"Sore Throat: Care Instructions.\"  Current as of: March 1, 2023               Content Version: 13.7    1268-8634 [employer].   Care instructions adapted under license by your healthcare professional. If you have questions about a medical condition or this instruction, always ask your healthcare professional. [employer] disclaims any warranty or liability for your use of this information.      Dear Joshua Henderson,    Based on your responses, you may have COVID or strep. I have placed an order for these tests. The clinic test is a little more accurate than the home tests so I recommend having this collected even though you had a negative test at home.     To schedule: go to your [employer] home page and scroll down to the section that says  You have an appointment that needs to be scheduled  and click the large green button that says  Schedule Now  and follow the steps to find the next available openings.    If you are unable to complete these [employer] scheduling steps, please call [phone] to schedule your testing.     To help ease your symptoms, I recommend that you drink plenty of fluids and rest. You may use salt water gargles- about 8 oz warm water with about 1 teaspoon salt- 2-3 times daily to help ease your symptoms. Over the counter pain relievers such as Tylenol or ibuprofen may be used as needed. Honey " "lemon tea helps to soothe the throat. \"Throat Coat\" tea is soothing as well. Please follow up with primary care provider if not improving in 3 more days, symptoms are worsening or new symptoms develop.    Thanks for choosing us as a partner in your care,    Shari Ronquillo PA-C  [facility]    "

## 2023-10-19 ENCOUNTER — ANCILLARY PROCEDURE (OUTPATIENT)
Dept: GENERAL RADIOLOGY | Facility: CLINIC | Age: 63
End: 2023-10-19
Attending: PHYSICIAN ASSISTANT
Payer: COMMERCIAL

## 2023-10-19 ENCOUNTER — OFFICE VISIT (OUTPATIENT)
Dept: PEDIATRICS | Facility: CLINIC | Age: 63
End: 2023-10-19
Payer: COMMERCIAL

## 2023-10-19 VITALS
TEMPERATURE: 97.9 F | DIASTOLIC BLOOD PRESSURE: 78 MMHG | SYSTOLIC BLOOD PRESSURE: 113 MMHG | HEART RATE: 95 BPM | WEIGHT: 152.5 LBS | BODY MASS INDEX: 24.61 KG/M2 | RESPIRATION RATE: 16 BRPM | OXYGEN SATURATION: 99 %

## 2023-10-19 DIAGNOSIS — R05.1 ACUTE COUGH: Primary | ICD-10-CM

## 2023-10-19 DIAGNOSIS — J18.9 PNEUMONIA DUE TO INFECTIOUS ORGANISM, UNSPECIFIED LATERALITY, UNSPECIFIED PART OF LUNG: ICD-10-CM

## 2023-10-19 DIAGNOSIS — J06.9 UPPER RESPIRATORY TRACT INFECTION, UNSPECIFIED TYPE: ICD-10-CM

## 2023-10-19 DIAGNOSIS — R05.1 ACUTE COUGH: ICD-10-CM

## 2023-10-19 PROCEDURE — 99214 OFFICE O/P EST MOD 30 MIN: CPT | Performed by: PHYSICIAN ASSISTANT

## 2023-10-19 PROCEDURE — 71046 X-RAY EXAM CHEST 2 VIEWS: CPT | Mod: TC | Performed by: RADIOLOGY

## 2023-10-19 RX ORDER — AZITHROMYCIN 250 MG/1
TABLET, FILM COATED ORAL
Qty: 6 TABLET | Refills: 0 | Status: SHIPPED | OUTPATIENT
Start: 2023-10-19 | End: 2023-10-24

## 2023-10-19 ASSESSMENT — PAIN SCALES - GENERAL: PAINLEVEL: NO PAIN (0)

## 2023-10-19 NOTE — PROGRESS NOTES
Assessment & Plan     Acute cough    - XR Chest 2 Views; Future    Upper respiratory tract infection, unspecified type    - azithromycin (ZITHROMAX) 250 MG tablet; Take 2 tablets (500 mg) by mouth daily for 1 day, THEN 1 tablet (250 mg) daily for 4 days.    Pneumonia    - Amoxicillin in addition to the Azithromycin.      Patient was seen today with concerns of continued URI symptoms since last week Wednesday.  He is not having fevers or chills or any pain or chest pain.  He does have a cough, but no sinus pain or pressure.  He is well appearing on exam today with normal lung sounds, normal vitals and exam is normal as well.  Patient was given the Z pack to start if his symptoms continue over the next couple of days.  He should seek followup if symptoms change or worsen in any way.  Patient understands and agrees with the plan today.        Chart UPDATE:  Likely Pneumonia on chest XR.  Will treat with both azithromycin and amoxicillin.  Patient was notified and understands and agrees with the plan today.      Brenda Zambrano PA-C  [facility] CRISTINO Lewis is a 63 year old, presenting for the following health issues:  URI (Cough, congestion)        10/19/2023    10:46 AM   Additional Questions   Roomed by Stefany Nina CMA   Accompanied by N/A         10/19/2023    10:46 AM   Patient Reported Additional Medications   Patient reports taking the following new medications N/A       URI    History of Present Illness       Reason for visit:  F/U on virtual visit last Friday; not getting better, developed cough    He eats 2-3 servings of fruits and vegetables daily.He consumes 0 sweetened beverage(s) daily.   He is taking medications regularly.     He reports that he has had symptoms for over a week.  His daughter did have COVID last week, but the patient has tested negative.  He started getting sick with a runny nose about 8 days ago.  He had plugged ears and did a virtual visit on Friday  and had a strep and COVID test which were both negative.  On Sunday he started to cough and the cough has become more and more.  He is a little more winded, but fine at rest.  He is not having fevers or chills.  He is not producing sputum with the cough.  He is not having any sinus pain or pressure.  He denies chest pain.         Review of Systems   Constitutional, HEENT, cardiovascular, pulmonary, gi and gu systems are negative, except as otherwise noted.      Objective    /78 (BP Location: Right arm, Patient Position: Sitting, Cuff Size: Adult Large)   Pulse 95   Temp 97.9  F (36.6  C) (Oral)   Resp 16   Wt 69.2 kg (152 lb 8 oz)   SpO2 99%   BMI 24.61 kg/m    Body mass index is 24.61 kg/m .  Physical Exam   GENERAL: healthy, alert and no distress  EYES: Eyes grossly normal to inspection, PERRL and conjunctivae and sclerae normal  HENT: ear canals and TM's normal, nose and mouth without ulcers or lesions  NECK: no adenopathy, no asymmetry, masses, or scars and thyroid normal to palpation  RESP: lungs clear to auscultation - no rales, rhonchi or wheezes  CV: regular rate and rhythm, normal S1 S2, no S3 or S4, no murmur, click or rub, no peripheral edema and peripheral pulses strong  MS: no gross musculoskeletal defects noted, no edema    Chest XR:  Unremarkable.  No Acute infection noted.        Brenda Zambrano PA-C

## 2023-10-20 RX ORDER — AMOXICILLIN 500 MG/1
1000 CAPSULE ORAL 3 TIMES DAILY
Qty: 30 CAPSULE | Refills: 0 | Status: SHIPPED | OUTPATIENT
Start: 2023-10-20 | End: 2023-10-25

## 2023-10-20 NOTE — RESULT ENCOUNTER NOTE
Note to staff: Please call the patient to explain results.    The results from your recent chest x ray were read by radiology and they do see a small area that could indicate pneumonia.  We should treat this.  You can take the Azithromycin that were prescribed at your visit, but we should also add on Amoxicillin as well.  I do not see that you have any allergies to amoxicillin, please verify this is correct and we will send it to your pharmacy as well.  You should take both the Azithromycin and the Amoxicillin for treatment.  Please followup if you do not improve as expected.        Thank you for choosing [facility] for your health care needs,      Brenda Zambrano PA-C RN, can you please route back to me once you have spoken with patient and I will send the amoxicillin to his pharmacy.  Thank you.

## 2023-10-25 ENCOUNTER — MYC MEDICAL ADVICE (OUTPATIENT)
Dept: PEDIATRICS | Facility: CLINIC | Age: 63
End: 2023-10-25
Payer: COMMERCIAL

## 2023-10-25 DIAGNOSIS — R05.1 ACUTE COUGH: Primary | ICD-10-CM

## 2023-10-25 NOTE — TELEPHONE ENCOUNTER
Patient sent [facility] Message stating he finished Azithromycin 10/23/23 and Amoxicillin 10/25/23, still having cough (intermittently productive). Worse in evening. Wondering any other suggestions for cough.   RN wanting to know what color sputum he is coughing up and if it is blood-tinged. Recommended cough drops and hot liquids with honey. Advised of symptoms get worse to call [phone] to speak with a triage nurse.     Signed by Luciana CARIAS RN on 10/25/2023 at 10:11 AM

## 2023-10-26 RX ORDER — BENZONATATE 200 MG/1
200 CAPSULE ORAL 3 TIMES DAILY PRN
Qty: 15 CAPSULE | Refills: 0 | Status: SHIPPED | OUTPATIENT
Start: 2023-10-26 | End: 2024-04-22

## 2023-10-26 NOTE — TELEPHONE ENCOUNTER
Yes, ok for script for tessalon.  I did call patient and spoke with him.  He is feeling better, but still coughing.  He was advised that the cough can linger and should be improving over time.  He was advised to followup in the clinic if his symptoms change or worsen in any way.

## 2023-10-26 NOTE — TELEPHONE ENCOUNTER
Patient sending message regarding request for benzonatate script for ongoing cough. Patient was seen for WILD w/ BEATA 10/19/23. Do you want e-visit or advise visit in clinic or ok for script?     José Luis HATCH RN 10/26/2023 at 10:56 AM

## 2023-12-21 ENCOUNTER — MYC REFILL (OUTPATIENT)
Dept: CARDIOLOGY | Facility: CLINIC | Age: 63
End: 2023-12-21
Payer: COMMERCIAL

## 2023-12-21 DIAGNOSIS — I10 BENIGN ESSENTIAL HYPERTENSION: ICD-10-CM

## 2023-12-21 RX ORDER — LISINOPRIL 2.5 MG/1
2.5 TABLET ORAL DAILY
Qty: 90 TABLET | Refills: 1 | Status: SHIPPED | OUTPATIENT
Start: 2023-12-21 | End: 2024-02-07

## 2024-02-07 ENCOUNTER — OFFICE VISIT (OUTPATIENT)
Dept: CARDIOLOGY | Facility: CLINIC | Age: 64
End: 2024-02-07
Attending: NURSE PRACTITIONER
Payer: COMMERCIAL

## 2024-02-07 VITALS
WEIGHT: 155.2 LBS | OXYGEN SATURATION: 99 % | HEIGHT: 66 IN | SYSTOLIC BLOOD PRESSURE: 116 MMHG | BODY MASS INDEX: 24.94 KG/M2 | HEART RATE: 81 BPM | DIASTOLIC BLOOD PRESSURE: 68 MMHG

## 2024-02-07 DIAGNOSIS — I71.21 ANEURYSM OF ASCENDING AORTA WITHOUT RUPTURE (H): Primary | ICD-10-CM

## 2024-02-07 DIAGNOSIS — I10 BENIGN ESSENTIAL HYPERTENSION: ICD-10-CM

## 2024-02-07 DIAGNOSIS — Q23.81 BICUSPID AORTIC VALVE: ICD-10-CM

## 2024-02-07 PROCEDURE — 99214 OFFICE O/P EST MOD 30 MIN: CPT | Performed by: INTERNAL MEDICINE

## 2024-02-07 RX ORDER — LISINOPRIL 2.5 MG/1
2.5 TABLET ORAL DAILY
Qty: 90 TABLET | Refills: 3 | Status: SHIPPED | OUTPATIENT
Start: 2024-02-07

## 2024-02-07 NOTE — LETTER
2/7/2024    Paul Leblanc MD  [address] [place] [address]    RE: Joshua Henderson       Dear Colleague,     I had the pleasure of seeing Joshua Henderson in the [facility].  HPI and Plan:   I saw Joshua today.  He has a bicuspid aortic valve with a very mild aortic root enlargement.  He is on very low-dose lisinopril at higher doses he became symptomatic and that is the reason he is not on strong beta-blockers or stronger blood pressure pills his blood pressure numbers are quite good right now.  Last year or so he was a little bit short of breath with exertion a stress echocardiogram was unremarkable get only mild aortic valve stenosis.  EKG was normal he had no symptoms on the treadmill I did review his blood work.  Within the last year electrolyte panel was normal he is on lisinopril stress test I discussed above.  At this juncture his symptoms are stable he gets a little short of breath if he goes up 4 flights of stairs otherwise he is not really symptomatic and no real chest pain so I do not think this is an anginal equivalent a stress echo did measure the aortic valve gradient at under 10 mmHg so this is a very slowly progressive aortic valve and aorta root problem.  I will see the patient back in 2 years for office visit with a standard echo I asked Dr. Leblanc to please renew his lisinopril and check a BMP next year    Today's office visit was 26 minutes    Orders Placed This Encounter   Procedures    Follow-Up with Cardiologist     No orders of the defined types were placed in this encounter.    There are no discontinued medications.      Encounter Diagnoses   Name Primary?    Bicuspid aortic valve     Aneurysm of ascending aorta without rupture (H24) Yes    Benign essential hypertension        CURRENT MEDICATIONS:  Current Outpatient Medications   Medication Sig Dispense Refill    calcium carbonate (OS-RICHMOND 500 MG [place]. [place]) 500 MG tablet Take 500 mg by mouth 2 times daily  Calcium Citrate      famotidine (PEPCID) 20 MG tablet Take 20 mg by mouth 2 times daily as needed      lisinopril (ZESTRIL) 2.5 MG tablet Take 1 tablet (2.5 mg) by mouth daily 90 tablet 1    multivitamin, therapeutic with minerals (THERA-VIT-M) TABS Take 1 tablet by mouth daily      benzonatate (TESSALON) 200 MG capsule Take 1 capsule (200 mg) by mouth 3 times daily as needed for cough 15 capsule 0       ALLERGIES     Allergies   Allergen Reactions    Sulfa Antibiotics     Duricef [Cefadroxil Monohydrate] Rash     Rash         PAST MEDICAL HISTORY:  Past Medical History:   Diagnosis Date    Annular pancreas     Congenital aortic stenosis     mild  bicuspid aortic valve    Epididymitis, bilateral     Gastroesophageal reflux disease without esophagitis     rare    Scoliosis     kyphosis, prior bracing        PAST SURGICAL HISTORY:  Past Surgical History:   Procedure Laterality Date    CARDIAC CATH ARTERIAL      age 8, dx bicuspid aortic valve    SURGICAL HISTORY OF -       excision annular pancreas lesion age 3days       FAMILY HISTORY:  Family History   Problem Relation Age of Onset    Cancer Father          55 of metastatic melanoma    Hypertension Father     Cardiovascular Mother         bradycardia, pacemaker    Hypertension Mother     Diabetes Brother     Cancer - colorectal No family hx of     Prostate Cancer No family hx of        SOCIAL HISTORY:  Social History     Socioeconomic History    Marital status:      Spouse name: None    Number of children: None    Years of education: None    Highest education level: None   Tobacco Use    Smoking status: Never    Smokeless tobacco: Never   Vaping Use    Vaping Use: Never used   Substance and Sexual Activity    Alcohol use: No    Drug use: No     Comment: caffeine 1/d    Sexual activity: Yes     Partners: Female     Social Determinants of Health     Financial Resource Strain: Low Risk  (10/19/2023)    Financial Resource Strain     Within the past 12 months,  "have you or your family members you live with been unable to get utilities (heat, electricity) when it was really needed?: No   Food Insecurity: Low Risk  (10/19/2023)    Food Insecurity     Within the past 12 months, did you worry that your food would run out before you got money to buy more?: No     Within the past 12 months, did the food you bought just not last and you didn t have money to get more?: No   Transportation Needs: Low Risk  (10/19/2023)    Transportation Needs     Within the past 12 months, has lack of transportation kept you from medical appointments, getting your medicines, non-medical meetings or appointments, work, or from getting things that you need?: No   Interpersonal Safety: Low Risk  (10/19/2023)    Interpersonal Safety     Do you feel physically and emotionally safe where you currently live?: Yes     Within the past 12 months, have you been hit, slapped, kicked or otherwise physically hurt by someone?: No     Within the past 12 months, have you been humiliated or emotionally abused in other ways by your partner or ex-partner?: No   Housing Stability: Low Risk  (10/19/2023)    Housing Stability     Do you have housing? : Yes     Are you worried about losing your housing?: No       Review of Systems:  Skin:        Eyes:       ENT:       Respiratory:       Cardiovascular:       Gastroenterology:      Genitourinary:       Musculoskeletal:       Neurologic:       Psychiatric:       Heme/Lymph/Imm:       Endocrine:         Physical Exam:  Vitals: /68 (BP Location: Right arm, Patient Position: Sitting, Cuff Size: Adult Regular)   Pulse 81   Ht 1.676 m (5' 6\")   Wt 70.4 kg (155 lb 3.2 oz)   SpO2 99%   BMI 25.05 kg/m   Orthostatic Vitals from 02/05/24 1038 to 02/07/24 1038    Date and Time Orthostatic BP Orthostatic Pulse Patient Position BP   Location Cuff Size   02/07/24 0935 -- -- Sitting Right arm Adult Regular         Constitutional:  cooperative, alert and oriented, well " "developed, well nourished, in no acute distress        Skin:  warm and dry to the touch, no apparent skin lesions or masses noted          Head:  normocephalic, no masses or lesions        Eyes:  pupils equal and round, conjunctivae and lids unremarkable, sclera white, no xanthalasma, EOMS intact, no nystagmus        Lymph:No Cervical lymphadenopathy present;No thyromegaly     ENT:           Neck:  carotid pulses are full and equal bilaterally, JVP normal, no carotid bruit        Respiratory:  normal breath sounds, clear to auscultation, normal A-P diameter, normal symmetry, normal respiratory excursion, no use of accessory muscles         Cardiac: regular rhythm       early systolic murmur;grade 1        pulses full and equal, no bruits auscultated                                        GI:  abdomen soft, non-tender, BS normoactive, no mass, no HSM, no bruits        Extremities and Muscular Skeletal:  no deformities, clubbing, cyanosis, erythema observed;no edema              Neurological:  no gross motor deficits        Psych:  Alert and Oriented x 3      Recent Lab Results:  LIPID RESULTS:  Lab Results   Component Value Date    CHOL 151 07/17/2012    HDL 34 (L) 07/17/2012    LDL 89 07/17/2012    TRIG 144 07/17/2012    CHOLHDLRATIO 4.5 07/17/2012       LIVER ENZYME RESULTS:  Lab Results   Component Value Date    AST 23 07/17/2012    ALT <6 07/17/2012       CBC RESULTS:  No results found for: \"WBC\", \"RBC\", \"HGB\", \"HCT\", \"MCV\", \"MCH\", \"MCHC\", \"RDW\", \"PLT\"    BMP RESULTS:  Lab Results   Component Value Date     04/24/2023     07/17/2012    POTASSIUM 4.0 04/24/2023    POTASSIUM 3.7 04/19/2022    POTASSIUM 3.9 07/17/2012    CHLORIDE 105 04/24/2023    CHLORIDE 109 04/19/2022    CHLORIDE 105 07/17/2012    CO2 28 04/24/2023    CO2 28 04/19/2022    CO2 27 07/17/2012    ANIONGAP 8 04/24/2023    ANIONGAP 3 04/19/2022    ANIONGAP 11 07/17/2012    GLC 74 04/24/2023     (H) 04/19/2022    GLC 91 07/17/2012    " "BUN 15.0 04/24/2023    BUN 13 04/19/2022    BUN 11 07/17/2012    CR 0.90 04/24/2023    CR 0.84 07/17/2012    GFRESTIMATED >90 04/24/2023    GFRESTIMATED >90 07/17/2012    GFRESTBLACK >90 07/17/2012    RICHMOND 9.2 04/24/2023    RICHMOND 9.2 07/17/2012        A1C RESULTS:  No results found for: \"A1C\"    INR RESULTS:  No results found for: \"INR\"        CC  HERIBERTO Romero CNP  [id number]  [place] [address]      Thank you for allowing me to participate in the care of your patient.      Sincerely,     Rohan Roe MD     [facility]  "

## 2024-02-07 NOTE — PROGRESS NOTES
HPI and Plan:   I saw Joshua today.  He has a bicuspid aortic valve with a very mild aortic root enlargement.  He is on very low-dose lisinopril at higher doses he became symptomatic and that is the reason he is not on strong beta-blockers or stronger blood pressure pills his blood pressure numbers are quite good right now.  Last year or so he was a little bit short of breath with exertion a stress echocardiogram was unremarkable get only mild aortic valve stenosis.  EKG was normal he had no symptoms on the treadmill I did review his blood work.  Within the last year electrolyte panel was normal he is on lisinopril stress test I discussed above.  At this juncture his symptoms are stable he gets a little short of breath if he goes up 4 flights of stairs otherwise he is not really symptomatic and no real chest pain so I do not think this is an anginal equivalent a stress echo did measure the aortic valve gradient at under 10 mmHg so this is a very slowly progressive aortic valve and aorta root problem.  I will see the patient back in 2 years for office visit with a standard echo I asked Dr. Leblanc to please renew his lisinopril and check a BMP next year    Today's office visit was 26 minutes    Orders Placed This Encounter   Procedures    Follow-Up with Cardiologist     No orders of the defined types were placed in this encounter.    There are no discontinued medications.      Encounter Diagnoses   Name Primary?    Bicuspid aortic valve     Aneurysm of ascending aorta without rupture (H24) Yes    Benign essential hypertension        CURRENT MEDICATIONS:  Current Outpatient Medications   Medication Sig Dispense Refill    calcium carbonate (OS-RICHMOND 500 MG [place]. [place]) 500 MG tablet Take 500 mg by mouth 2 times daily Calcium Citrate      famotidine (PEPCID) 20 MG tablet Take 20 mg by mouth 2 times daily as needed      lisinopril (ZESTRIL) 2.5 MG tablet Take 1 tablet (2.5 mg) by mouth daily 90 tablet 1    multivitamin,  therapeutic with minerals (THERA-VIT-M) TABS Take 1 tablet by mouth daily      benzonatate (TESSALON) 200 MG capsule Take 1 capsule (200 mg) by mouth 3 times daily as needed for cough 15 capsule 0       ALLERGIES     Allergies   Allergen Reactions    Sulfa Antibiotics     Duricef [Cefadroxil Monohydrate] Rash     Rash         PAST MEDICAL HISTORY:  Past Medical History:   Diagnosis Date    Annular pancreas     Congenital aortic stenosis     mild  bicuspid aortic valve    Epididymitis, bilateral     Gastroesophageal reflux disease without esophagitis     rare    Scoliosis     kyphosis, prior bracing        PAST SURGICAL HISTORY:  Past Surgical History:   Procedure Laterality Date    CARDIAC CATH ARTERIAL      age 8, dx bicuspid aortic valve    SURGICAL HISTORY OF -       excision annular pancreas lesion age 3days       FAMILY HISTORY:  Family History   Problem Relation Age of Onset    Cancer Father          55 of metastatic melanoma    Hypertension Father     Cardiovascular Mother         bradycardia, pacemaker    Hypertension Mother     Diabetes Brother     Cancer - colorectal No family hx of     Prostate Cancer No family hx of        SOCIAL HISTORY:  Social History     Socioeconomic History    Marital status:      Spouse name: None    Number of children: None    Years of education: None    Highest education level: None   Tobacco Use    Smoking status: Never    Smokeless tobacco: Never   Vaping Use    Vaping Use: Never used   Substance and Sexual Activity    Alcohol use: No    Drug use: No     Comment: caffeine 1/d    Sexual activity: Yes     Partners: Female     Social Determinants of Health     Financial Resource Strain: Low Risk  (10/19/2023)    Financial Resource Strain     Within the past 12 months, have you or your family members you live with been unable to get utilities (heat, electricity) when it was really needed?: No   Food Insecurity: Low Risk  (10/19/2023)    Food Insecurity     Within the  "past 12 months, did you worry that your food would run out before you got money to buy more?: No     Within the past 12 months, did the food you bought just not last and you didn t have money to get more?: No   Transportation Needs: Low Risk  (10/19/2023)    Transportation Needs     Within the past 12 months, has lack of transportation kept you from medical appointments, getting your medicines, non-medical meetings or appointments, work, or from getting things that you need?: No   Interpersonal Safety: Low Risk  (10/19/2023)    Interpersonal Safety     Do you feel physically and emotionally safe where you currently live?: Yes     Within the past 12 months, have you been hit, slapped, kicked or otherwise physically hurt by someone?: No     Within the past 12 months, have you been humiliated or emotionally abused in other ways by your partner or ex-partner?: No   Housing Stability: Low Risk  (10/19/2023)    Housing Stability     Do you have housing? : Yes     Are you worried about losing your housing?: No       Review of Systems:  Skin:        Eyes:       ENT:       Respiratory:       Cardiovascular:       Gastroenterology:      Genitourinary:       Musculoskeletal:       Neurologic:       Psychiatric:       Heme/Lymph/Imm:       Endocrine:         Physical Exam:  Vitals: /68 (BP Location: Right arm, Patient Position: Sitting, Cuff Size: Adult Regular)   Pulse 81   Ht 1.676 m (5' 6\")   Wt 70.4 kg (155 lb 3.2 oz)   SpO2 99%   BMI 25.05 kg/m   Orthostatic Vitals from 02/05/24 1038 to 02/07/24 1038    Date and Time Orthostatic BP Orthostatic Pulse Patient Position BP   Location Cuff Size   02/07/24 0935 -- -- Sitting Right arm Adult Regular         Constitutional:  cooperative, alert and oriented, well developed, well nourished, in no acute distress        Skin:  warm and dry to the touch, no apparent skin lesions or masses noted          Head:  normocephalic, no masses or lesions        Eyes:  pupils equal " "and round, conjunctivae and lids unremarkable, sclera white, no xanthalasma, EOMS intact, no nystagmus        Lymph:No Cervical lymphadenopathy present;No thyromegaly     ENT:           Neck:  carotid pulses are full and equal bilaterally, JVP normal, no carotid bruit        Respiratory:  normal breath sounds, clear to auscultation, normal A-P diameter, normal symmetry, normal respiratory excursion, no use of accessory muscles         Cardiac: regular rhythm       early systolic murmur;grade 1        pulses full and equal, no bruits auscultated                                        GI:  abdomen soft, non-tender, BS normoactive, no mass, no HSM, no bruits        Extremities and Muscular Skeletal:  no deformities, clubbing, cyanosis, erythema observed;no edema              Neurological:  no gross motor deficits        Psych:  Alert and Oriented x 3      Recent Lab Results:  LIPID RESULTS:  Lab Results   Component Value Date    CHOL 151 07/17/2012    HDL 34 (L) 07/17/2012    LDL 89 07/17/2012    TRIG 144 07/17/2012    CHOLHDLRATIO 4.5 07/17/2012       LIVER ENZYME RESULTS:  Lab Results   Component Value Date    AST 23 07/17/2012    ALT <6 07/17/2012       CBC RESULTS:  No results found for: \"WBC\", \"RBC\", \"HGB\", \"HCT\", \"MCV\", \"MCH\", \"MCHC\", \"RDW\", \"PLT\"    BMP RESULTS:  Lab Results   Component Value Date     04/24/2023     07/17/2012    POTASSIUM 4.0 04/24/2023    POTASSIUM 3.7 04/19/2022    POTASSIUM 3.9 07/17/2012    CHLORIDE 105 04/24/2023    CHLORIDE 109 04/19/2022    CHLORIDE 105 07/17/2012    CO2 28 04/24/2023    CO2 28 04/19/2022    CO2 27 07/17/2012    ANIONGAP 8 04/24/2023    ANIONGAP 3 04/19/2022    ANIONGAP 11 07/17/2012    GLC 74 04/24/2023     (H) 04/19/2022    GLC 91 07/17/2012    BUN 15.0 04/24/2023    BUN 13 04/19/2022    BUN 11 07/17/2012    CR 0.90 04/24/2023    CR 0.84 07/17/2012    GFRESTIMATED >90 04/24/2023    GFRESTIMATED >90 07/17/2012    GFRESTBLACK >90 07/17/2012    RICHMOND 9.2 " "04/24/2023    RICHMOND 9.2 07/17/2012        A1C RESULTS:  No results found for: \"A1C\"    INR RESULTS:  No results found for: \"INR\"        HERIBERTO Tyler CNP  [address]  [place] [id number]  "

## 2024-04-22 ENCOUNTER — MYC MEDICAL ADVICE (OUTPATIENT)
Dept: INTERNAL MEDICINE | Facility: CLINIC | Age: 64
End: 2024-04-22

## 2024-04-22 ENCOUNTER — OFFICE VISIT (OUTPATIENT)
Dept: INTERNAL MEDICINE | Facility: CLINIC | Age: 64
End: 2024-04-22
Payer: COMMERCIAL

## 2024-04-22 VITALS
HEIGHT: 66 IN | BODY MASS INDEX: 25.2 KG/M2 | OXYGEN SATURATION: 98 % | HEART RATE: 80 BPM | SYSTOLIC BLOOD PRESSURE: 112 MMHG | RESPIRATION RATE: 16 BRPM | WEIGHT: 156.8 LBS | DIASTOLIC BLOOD PRESSURE: 66 MMHG

## 2024-04-22 DIAGNOSIS — N45.1 CHRONIC EPIDIDYMITIS: ICD-10-CM

## 2024-04-22 DIAGNOSIS — N45.1 CHRONIC EPIDIDYMITIS: Primary | ICD-10-CM

## 2024-04-22 PROCEDURE — 99213 OFFICE O/P EST LOW 20 MIN: CPT | Performed by: PHYSICIAN ASSISTANT

## 2024-04-22 RX ORDER — DOXYCYCLINE 100 MG/1
100 CAPSULE ORAL 2 TIMES DAILY
Qty: 60 CAPSULE | Refills: 0 | Status: SHIPPED | OUTPATIENT
Start: 2024-04-22 | End: 2024-04-22

## 2024-04-22 RX ORDER — DOXYCYCLINE 100 MG/1
100 CAPSULE ORAL 2 TIMES DAILY
Qty: 60 CAPSULE | Refills: 0 | Status: SHIPPED | OUTPATIENT
Start: 2024-04-22

## 2024-04-22 NOTE — PROGRESS NOTES
"  Assessment & Plan     Chronic epididymitis    - doxycycline hyclate (VIBRAMYCIN) 100 MG capsule; Take 1 capsule (100 mg) by mouth 2 times daily for 30 days            BMI  Estimated body mass index is 25.31 kg/m  as calculated from the following:    Height as of this encounter: 1.676 m (5' 6\").    Weight as of this encounter: 71.1 kg (156 lb 12.8 oz).             Chey Lewis is a 63 year old, presenting for the following health issues:  Penis/Scrotum Problem    History of Present Illness       Reason for visit:  Epididymitis / Orchitis flare-up    He eats 2-3 servings of fruits and vegetables daily.He consumes 0 sweetened beverage(s) daily.He exercises with enough effort to increase his heart rate 10 to 19 minutes per day.  He exercises with enough effort to increase his heart rate 5 days per week.   He is taking medications regularly.     Patient with past medical history of chronic epididymitis with flares of worsening symptoms intermittently  Last seen for this in 2/2023 and treatment with doxycycline.  Has seen urology a few years ago - reviewed Epic office notes. Then treatment with doxycycline for 6 weeks.     Patient denies any fevers or abdominal pain.  No dysuria             Review of Systems  Constitutional,  cardiovascular, pulmonary, gi and gu systems are negative, except as otherwise noted.      Objective    /66   Pulse 80   Resp 16   Ht 1.676 m (5' 6\")   Wt 71.1 kg (156 lb 12.8 oz)   SpO2 98%   BMI 25.31 kg/m    Body mass index is 25.31 kg/m .  Physical Exam   GENERAL: alert and no distress   (male): testicles normal without atrophy or masses, epididymal enlargement bilateral, no hernias, penis normal without urethral discharge, and tenderness to palpation bilaterally epididymis   MS: no gross musculoskeletal defects noted, no edema            Signed Electronically by: Radha Villegas PA-C    "

## 2024-04-22 NOTE — TELEPHONE ENCOUNTER
Patient calling to request doxycycline be resent to a different pharmacy, as [facility] did not have medication available.     Writer resent doxycycline to requested [facility] with original start date, closing encounter.    Montse Segura RN  -[facility]

## 2024-08-04 ENCOUNTER — MYC MEDICAL ADVICE (OUTPATIENT)
Dept: INTERNAL MEDICINE | Facility: CLINIC | Age: 64
End: 2024-08-04
Payer: COMMERCIAL

## 2024-08-04 ENCOUNTER — HEALTH MAINTENANCE LETTER (OUTPATIENT)
Age: 64
End: 2024-08-04

## 2024-08-15 ENCOUNTER — E-VISIT (OUTPATIENT)
Dept: INTERNAL MEDICINE | Facility: CLINIC | Age: 64
End: 2024-08-15
Payer: COMMERCIAL

## 2024-08-15 DIAGNOSIS — N45.1 CHRONIC EPIDIDYMITIS: Primary | ICD-10-CM

## 2024-08-15 PROCEDURE — 99207 PR NO CHARGE LOS: CPT | Performed by: INTERNAL MEDICINE

## 2024-08-15 RX ORDER — DOXYCYCLINE 100 MG/1
100 CAPSULE ORAL 2 TIMES DAILY
Qty: 84 CAPSULE | Refills: 0 | Status: SHIPPED | OUTPATIENT
Start: 2024-08-15 | End: 2024-09-26

## 2024-08-16 ENCOUNTER — HOSPITAL ENCOUNTER (EMERGENCY)
Facility: CLINIC | Age: 64
Discharge: HOME | End: 2024-08-17
Attending: EMERGENCY MEDICINE | Admitting: EMERGENCY MEDICINE
Payer: COMMERCIAL

## 2024-08-16 DIAGNOSIS — R53.83 OTHER FATIGUE: ICD-10-CM

## 2024-08-16 DIAGNOSIS — R07.9 NONSPECIFIC CHEST PAIN: ICD-10-CM

## 2024-08-16 DIAGNOSIS — D64.9 ANEMIA, UNSPECIFIED TYPE: ICD-10-CM

## 2024-08-16 LAB
ANION GAP SERPL CALCULATED.3IONS-SCNC: 13 MMOL/L (ref 7–15)
BASOPHILS # BLD AUTO: 0 10E3/UL (ref 0–0.2)
BASOPHILS NFR BLD AUTO: 1 %
BUN SERPL-MCNC: 16.5 MG/DL (ref 8–23)
CALCIUM SERPL-MCNC: 9.1 MG/DL (ref 8.8–10.4)
CHLORIDE SERPL-SCNC: 106 MMOL/L (ref 98–107)
CREAT SERPL-MCNC: 0.9 MG/DL (ref 0.67–1.17)
EGFRCR SERPLBLD CKD-EPI 2021: >90 ML/MIN/1.73M2
EOSINOPHIL # BLD AUTO: 0.2 10E3/UL (ref 0–0.7)
EOSINOPHIL NFR BLD AUTO: 2 %
ERYTHROCYTE [DISTWIDTH] IN BLOOD BY AUTOMATED COUNT: 13.3 % (ref 10–15)
GLUCOSE SERPL-MCNC: 104 MG/DL (ref 70–99)
HCO3 SERPL-SCNC: 23 MMOL/L (ref 22–29)
HCT VFR BLD AUTO: 37.1 % (ref 40–53)
HGB BLD-MCNC: 12 G/DL (ref 13.3–17.7)
HOLD SPECIMEN: NORMAL
HOLD SPECIMEN: NORMAL
IMM GRANULOCYTES # BLD: 0 10E3/UL
IMM GRANULOCYTES NFR BLD: 0 %
LYMPHOCYTES # BLD AUTO: 1.5 10E3/UL (ref 0.8–5.3)
LYMPHOCYTES NFR BLD AUTO: 17 %
MCH RBC QN AUTO: 31.7 PG (ref 26.5–33)
MCHC RBC AUTO-ENTMCNC: 32.3 G/DL (ref 31.5–36.5)
MCV RBC AUTO: 98 FL (ref 78–100)
MONOCYTES # BLD AUTO: 0.9 10E3/UL (ref 0–1.3)
MONOCYTES NFR BLD AUTO: 11 %
NEUTROPHILS # BLD AUTO: 5.9 10E3/UL (ref 1.6–8.3)
NEUTROPHILS NFR BLD AUTO: 69 %
NRBC # BLD AUTO: 0 10E3/UL
NRBC BLD AUTO-RTO: 0 /100
PLATELET # BLD AUTO: 217 10E3/UL (ref 150–450)
POTASSIUM SERPL-SCNC: 4 MMOL/L (ref 3.4–5.3)
RBC # BLD AUTO: 3.79 10E6/UL (ref 4.4–5.9)
SODIUM SERPL-SCNC: 142 MMOL/L (ref 135–145)
TROPONIN T SERPL HS-MCNC: 7 NG/L
WBC # BLD AUTO: 8.6 10E3/UL (ref 4–11)

## 2024-08-16 PROCEDURE — 93005 ELECTROCARDIOGRAM TRACING: CPT

## 2024-08-16 PROCEDURE — 84484 ASSAY OF TROPONIN QUANT: CPT | Performed by: EMERGENCY MEDICINE

## 2024-08-16 PROCEDURE — 99285 EMERGENCY DEPT VISIT HI MDM: CPT | Mod: 25

## 2024-08-16 PROCEDURE — 80048 BASIC METABOLIC PNL TOTAL CA: CPT | Performed by: EMERGENCY MEDICINE

## 2024-08-16 PROCEDURE — 36415 COLL VENOUS BLD VENIPUNCTURE: CPT | Performed by: EMERGENCY MEDICINE

## 2024-08-16 PROCEDURE — 85025 COMPLETE CBC W/AUTO DIFF WBC: CPT | Performed by: EMERGENCY MEDICINE

## 2024-08-16 ASSESSMENT — COLUMBIA-SUICIDE SEVERITY RATING SCALE - C-SSRS
2. HAVE YOU ACTUALLY HAD ANY THOUGHTS OF KILLING YOURSELF IN THE PAST MONTH?: NO
6. HAVE YOU EVER DONE ANYTHING, STARTED TO DO ANYTHING, OR PREPARED TO DO ANYTHING TO END YOUR LIFE?: NO
1. IN THE PAST MONTH, HAVE YOU WISHED YOU WERE DEAD OR WISHED YOU COULD GO TO SLEEP AND NOT WAKE UP?: NO

## 2024-08-17 ENCOUNTER — APPOINTMENT (OUTPATIENT)
Dept: CT IMAGING | Facility: CLINIC | Age: 64
End: 2024-08-17
Attending: EMERGENCY MEDICINE
Payer: COMMERCIAL

## 2024-08-17 VITALS
RESPIRATION RATE: 18 BRPM | BODY MASS INDEX: 25.4 KG/M2 | HEIGHT: 66 IN | WEIGHT: 158.07 LBS | TEMPERATURE: 98.2 F | OXYGEN SATURATION: 99 % | SYSTOLIC BLOOD PRESSURE: 115 MMHG | DIASTOLIC BLOOD PRESSURE: 75 MMHG | HEART RATE: 63 BPM

## 2024-08-17 LAB — TROPONIN T SERPL HS-MCNC: 7 NG/L

## 2024-08-17 PROCEDURE — 250N000011 HC RX IP 250 OP 636: Performed by: EMERGENCY MEDICINE

## 2024-08-17 PROCEDURE — 84484 ASSAY OF TROPONIN QUANT: CPT | Performed by: EMERGENCY MEDICINE

## 2024-08-17 PROCEDURE — 71260 CT THORAX DX C+: CPT

## 2024-08-17 PROCEDURE — 36415 COLL VENOUS BLD VENIPUNCTURE: CPT | Performed by: EMERGENCY MEDICINE

## 2024-08-17 PROCEDURE — 84443 ASSAY THYROID STIM HORMONE: CPT

## 2024-08-17 PROCEDURE — 250N000009 HC RX 250: Performed by: EMERGENCY MEDICINE

## 2024-08-17 RX ORDER — IOPAMIDOL 755 MG/ML
500 INJECTION, SOLUTION INTRAVASCULAR ONCE
Status: COMPLETED | OUTPATIENT
Start: 2024-08-17 | End: 2024-08-17

## 2024-08-17 RX ADMIN — SODIUM CHLORIDE 60 ML: 9 INJECTION, SOLUTION INTRAVENOUS at 00:21

## 2024-08-17 RX ADMIN — IOPAMIDOL 72 ML: 755 INJECTION, SOLUTION INTRAVENOUS at 00:21

## 2024-08-17 ASSESSMENT — ACTIVITIES OF DAILY LIVING (ADL)
ADLS_ACUITY_SCORE: 35
ADLS_ACUITY_SCORE: 35

## 2024-08-17 NOTE — ED TRIAGE NOTES
"Presents to triage with c/o L anterior chest pain that radiates to L jaw. Pain started this evening. Patient states all day he has been feeling \"off.\" Hx aortic valve stenosis        "

## 2024-08-17 NOTE — ED PROVIDER NOTES
"  Emergency Department Note      History of Present Illness     Chief Complaint   Chest Pain and Jaw Pain      HPI   Joshua Henderson is a 63 year old male with history of aortic stenosis and ascending aortic aneurysm who presents with mild left anterior chest pain and neck pain starting earlier this evening.  He denies any associated shortness of breath, nausea/vomiting, diaphoresis.  He has not had the symptoms before.  He did not take anything for the symptoms.    Independent Historian   None    Review of External Notes   I reviewed cardiology office visit from 2/7/2024 reviewing his history of bicuspid aortic valve and mild aortic root dilation.    Past Medical History     Medical History and Problem List   Past Medical History:   Diagnosis Date    Annular pancreas     Congenital aortic stenosis     Epididymitis, bilateral     Gastroesophageal reflux disease without esophagitis     Scoliosis        Medications   calcium carbonate (OS-RICHMOND 500 MG [place]. [place]) 500 MG tablet  doxycycline hyclate (VIBRAMYCIN) 100 MG capsule  doxycycline hyclate (VIBRAMYCIN) 100 MG capsule  famotidine (PEPCID) 20 MG tablet  lisinopril (ZESTRIL) 2.5 MG tablet  multivitamin, therapeutic with minerals (THERA-VIT-M) TABS        Surgical History   Past Surgical History:   Procedure Laterality Date    CARDIAC CATH ARTERIAL      age 8, dx bicuspid aortic valve    SURGICAL HISTORY OF -       excision annular pancreas lesion age 3days       Physical Exam     Patient Vitals for the past 24 hrs:   BP Temp Temp src Pulse Resp SpO2 Height Weight   08/17/24 0215 115/75 -- -- 63 -- 99 % -- --   08/16/24 2221 (!) 157/77 98.2  F (36.8  C) Temporal 77 18 99 % 1.676 m (5' 6\") 71.7 kg (158 lb 1.1 oz)     Physical Exam  Vitals and nursing note reviewed.   Constitutional:       General: He is not in acute distress.     Appearance: He is not ill-appearing.   HENT:      Head: Normocephalic and atraumatic.      Right Ear: External ear normal.      Left Ear: " External ear normal.      Nose: Nose normal.   Eyes:      Conjunctiva/sclera: Conjunctivae normal.   Cardiovascular:      Rate and Rhythm: Normal rate and regular rhythm.      Pulses: Normal pulses.      Heart sounds: No murmur heard.  Pulmonary:      Effort: Pulmonary effort is normal. No respiratory distress.      Breath sounds: No wheezing, rhonchi or rales.   Abdominal:      General: Abdomen is flat. There is no distension.      Palpations: Abdomen is soft.      Tenderness: There is no abdominal tenderness. There is no guarding or rebound.   Musculoskeletal:         General: No swelling or deformity. Tenderness: .ecg.     Cervical back: Normal range of motion and neck supple.   Skin:     General: Skin is warm and dry.      Findings: No rash.   Neurological:      Mental Status: He is alert and oriented to person, place, and time.   Psychiatric:         Behavior: Behavior normal.           Diagnostics     Lab Results   Labs Ordered and Resulted from Time of ED Arrival to Time of ED Departure   BASIC METABOLIC PANEL - Abnormal       Result Value    Sodium 142      Potassium 4.0      Chloride 106      Carbon Dioxide (CO2) 23      Anion Gap 13      Urea Nitrogen 16.5      Creatinine 0.90      GFR Estimate >90      Calcium 9.1      Glucose 104 (*)    CBC WITH PLATELETS AND DIFFERENTIAL - Abnormal    WBC Count 8.6      RBC Count 3.79 (*)     Hemoglobin 12.0 (*)     Hematocrit 37.1 (*)     MCV 98      MCH 31.7      MCHC 32.3      RDW 13.3      Platelet Count 217      % Neutrophils 69      % Lymphocytes 17      % Monocytes 11      % Eosinophils 2      % Basophils 1      % Immature Granulocytes 0      NRBCs per 100 WBC 0      Absolute Neutrophils 5.9      Absolute Lymphocytes 1.5      Absolute Monocytes 0.9      Absolute Eosinophils 0.2      Absolute Basophils 0.0      Absolute Immature Granulocytes 0.0      Absolute NRBCs 0.0     TROPONIN T, HIGH SENSITIVITY - Normal    Troponin T, High Sensitivity 7     TROPONIN T, HIGH  SENSITIVITY - Normal    Troponin T, High Sensitivity 7         Imaging   CT Aortic Survey w Contrast   Final Result   IMPRESSION:      1. 42 mm diameter ascending thoracic aorta without evidence of dissection. The descending aorta, aortic arch branches and abdominal and pelvic arterial structures appear normal.   2. No acute abnormality is identified in the chest, abdomen, or the pelvis.   3. Degenerative change in the spine with old T7 and T8 compression deformities.          EKG   ECG results from 08/16/24   EKG 12-lead, tracing only     Value    Systolic Blood Pressure     Diastolic Blood Pressure     Ventricular Rate 77    Atrial Rate 77    KS Interval 160    QRS Duration 78        QTc 400    P Axis 45    R AXIS 26    T Axis 33    Interpretation ECG      Sinus rhythm with sinus arrhythmia  Normal ECG  When compared with ECG of 10-May-2023 10:12, (unconfirmed)  No significant change was found           Independent Interpretation   None    ED Course      Medications Administered   Medications   CT scan flush (60 mLs Intravenous $Given 8/17/24 0021)   iopamidol (ISOVUE-370) solution 500 mL (72 mLs Intravenous $Given 8/17/24 0021)       Procedures   Procedures     Discussion of Management   None    ED Course        Additional Documentation  None    Medical Decision Making / Diagnosis     CMS Diagnoses: None    MIPS       None    [facility]   Joshua Henderson is a 63 year old male who presents with chest pain radiating to the neck.  Symptoms seem fairly nonspecific and not particularly consistent with ACS, PE, dissection, etc.  However patient does have slight increased risk for dissection given his history of aortic stenosis and aortic root dilation.  Workup was pursued as noted.  His EKG showed no ischemic changes.  His troponin was normal and remained normal after repeat.  He was noted to be slightly anemic but we do not have any recent labs to compare to so not sure if this is new or chronic for him.  Given his  risk factors and hypertension on arrival, a CT of the aorta was performed and showed no signs of dissection.  I reassured the patient and recommended close follow-up with primary care and possibly his cardiologist as well.  We discussed return precautions.    Disposition   The patient was discharged.     Diagnosis     ICD-10-CM    1. Nonspecific chest pain  R07.9       2. Anemia, unspecified type  D64.9            Discharge Medications   Discharge Medication List as of 8/17/2024  2:11 AM            MD Di Haney Shaun M, MD  08/17/24 0222

## 2024-08-19 ENCOUNTER — PATIENT OUTREACH (OUTPATIENT)
Dept: PEDIATRICS | Facility: CLINIC | Age: 64
End: 2024-08-19
Payer: COMMERCIAL

## 2024-08-19 LAB
ATRIAL RATE - MUSE: 77 BPM
DIASTOLIC BLOOD PRESSURE - MUSE: NORMAL MMHG
INTERPRETATION ECG - MUSE: NORMAL
P AXIS - MUSE: 45 DEGREES
PR INTERVAL - MUSE: 160 MS
QRS DURATION - MUSE: 78 MS
QT - MUSE: 354 MS
QTC - MUSE: 400 MS
R AXIS - MUSE: 26 DEGREES
SYSTOLIC BLOOD PRESSURE - MUSE: NORMAL MMHG
T AXIS - MUSE: 33 DEGREES
VENTRICULAR RATE- MUSE: 77 BPM

## 2024-08-19 NOTE — TELEPHONE ENCOUNTER
"Call patient for hospital follow up.  Most Recent Admission Date: 8/16/2024   Most Recent Admission Diagnosis:      Most Recent Discharge Date: 8/17/2024   Most Recent Discharge Diagnosis: Nonspecific chest pain - R07.9  Anemia, unspecified type - D64.9     Med changes: none    After Visit Summary follow up recommendations: Schedule w/ PCP \"ASAP\" and follow up w/ patient's cardiologist.     Primary care appointment needed within 14 days    Primary care hospital follow up appointment has not been made.    José Luis Agosto RN      "

## 2024-08-19 NOTE — TELEPHONE ENCOUNTER
"  Transitions of Care Outreach  Chief Complaint   Patient presents with    Hospital F/U       Most Recent Admission Date: 8/16/2024   Most Recent Admission Diagnosis:      Most Recent Discharge Date: 8/17/2024   Most Recent Discharge Diagnosis: Nonspecific chest pain - R07.9  Anemia, unspecified type - D64.9     Transitions of Care Assessment    Discharge Assessment  How are you doing now that you are home?: doing fine; \"no more problem with that discomfort in my chest\"  How are your symptoms? (Red Flag symptoms escalate to triage hotline per guidelines): Improved  Does the patient have their discharge instructions? : Yes  Does the patient have questions regarding their discharge instructions? : Yes (see comment)  Were you started on any new medications or were there changes to any of your previous medications? : Yes  Does the patient have all of their medications?: Yes  Do you have questions regarding any of your medications? : No  Do you have all of your needed medical supplies or equipment (DME)?  (i.e. oxygen tank, CPAP, cane, etc.): Yes    Follow up Plan     Discharge Follow-Up  Discharge follow up appointment scheduled in alignment with recommended follow up timeframe or Transitions of Risk Category? (Low = within 30 days; Moderate= within 14 days; High= within 7 days): Yes  Discharge Follow Up Appointment Date: 08/21/24  Discharge Follow Up Appointment Scheduled with?: Primary Care Provider    Future Appointments   Date Time Provider Department Center   8/21/2024  9:30 AM Kavitha Jacobsen PA-C [facility]       Outpatient Plan as outlined on AVS reviewed with patient.    For any urgent concerns, please contact our 24 hour nurse triage line: [phone] ([phone])         S-(situation): Admitted 8/16 to ED for mild left anterior chest pain and neck pain starting earlier in the evening    B-(background): history of aortic stenosis and ascending aortic aneurysm. Saw cardiology 2/7/2024 " (Bhupinder)    A-(assessment):   a CT of the aorta was performed and showed no signs of dissection.     R-(recommendations): recommended close follow-up with primary care and possibly his cardiologist as well       Patient scheduled for hospital follow-up 8/21/2021 @ 9:30 with RACIEL Humphrey RN

## 2024-08-21 ENCOUNTER — OFFICE VISIT (OUTPATIENT)
Dept: PEDIATRICS | Facility: CLINIC | Age: 64
End: 2024-08-21
Payer: COMMERCIAL

## 2024-08-21 VITALS
WEIGHT: 156.3 LBS | HEART RATE: 64 BPM | OXYGEN SATURATION: 99 % | HEIGHT: 65 IN | DIASTOLIC BLOOD PRESSURE: 60 MMHG | TEMPERATURE: 97.2 F | RESPIRATION RATE: 16 BRPM | BODY MASS INDEX: 26.04 KG/M2 | SYSTOLIC BLOOD PRESSURE: 122 MMHG

## 2024-08-21 DIAGNOSIS — D64.9 ANEMIA, UNSPECIFIED TYPE: ICD-10-CM

## 2024-08-21 DIAGNOSIS — R07.9 CHEST PAIN, UNSPECIFIED TYPE: ICD-10-CM

## 2024-08-21 DIAGNOSIS — R53.83 OTHER FATIGUE: ICD-10-CM

## 2024-08-21 DIAGNOSIS — Z12.11 SCREEN FOR COLON CANCER: Primary | ICD-10-CM

## 2024-08-21 LAB — TSH SERPL DL<=0.005 MIU/L-ACNC: 2.29 UIU/ML (ref 0.3–4.2)

## 2024-08-21 PROCEDURE — G2211 COMPLEX E/M VISIT ADD ON: HCPCS | Performed by: PHYSICIAN ASSISTANT

## 2024-08-21 PROCEDURE — 99213 OFFICE O/P EST LOW 20 MIN: CPT | Performed by: PHYSICIAN ASSISTANT

## 2024-08-21 ASSESSMENT — PAIN SCALES - GENERAL: PAINLEVEL: NO PAIN (0)

## 2024-08-21 NOTE — PROGRESS NOTES
Assessment & Plan     Chest pain, unspecified type  Resolved. Continue to monitor.   If worsening symptoms follow-up in ER if pressure, increased pain with exertion, shortness of breath, diaphoresis, lightheadedness, shortness of breath    Other fatigue  Symptoms seem more related to season. Will check TSH today  - TSH with free T4 reflex    Anemia, unspecified type  Normocytic anemia. Hemoglobin 12  Consider follow-up if no findings on colonoscopy  Reports hx of anemia    Screen for colon cancer  Pt is due for colonoscopy. [facility] called to schedule today.  If needing an order, let us know and can send.    MED REC REQUIRED  Post Medication Reconciliation Status:  Patient was not discharged from an inpatient facility or TCU    FUTURE APPOINTMENTS:       - Follow-up for annual visit or as needed    Subjective   Joshua is a 63 year old, presenting for the following health issues:  Hospital F/U        8/21/2024     9:19 AM   Additional Questions   Roomed by America Landin   Accompanied by ABIMAEL MILLER     ED/UC Followup:    Facility:  [facility] ER   Date of visit: 08/16/2024  Reason for visit: Jaw/chest pain   Current Status: Patient states that pain has since gone away, both in neck and chest.       Patient is a 63 y.o. male who presents to the clinic for follow-up neck/chest pain that occurred on 8/16. Patient went to [facility] due to hx of Aneurysm of ascending aorta without rupture, Bicuspid aortic valve, Benign essential,  and Congenital aortic stenosis. Patient was worried about his heart. States he had this pressure like sensation in his chest and neck that wouldn't go away. Reports pain was 2-3/10 but occurred initially with going up numerous flights of stairs. Patient follows closely with cardiologist (Dr. Roe), last visit was Feb 2024. He had an echocardiogram completed May 2023 that was normal. He has not had any additional symptoms since this visit. Of note, patient hemoglobin at 12  "however reports history of low hemoglobin in the past. Takes multivitamin with iron in it daily.     Patient reports he is due for colonoscopy and received call from [facility]. Denies any changes with stools    Reports he is currently on Doxycycline for epididymitis.    Reports some fatigue and feeling cold. More in the winter but noticed it the other night. He is wondering about Thyroid check.    Review of Systems  Constitutional, HEENT, cardiovascular, pulmonary, gi and gu systems are negative, except as otherwise noted.      Objective    /60 (BP Location: Right arm, Patient Position: Sitting, Cuff Size: Adult Regular)   Pulse 64   Temp 97.2  F (36.2  C) (Tympanic)   Resp 16   Ht 1.651 m (5' 5\")   Wt 70.9 kg (156 lb 4.8 oz)   SpO2 99%   BMI 26.01 kg/m    Body mass index is 26.01 kg/m .    Physical Exam   GENERAL: alert and no distress  EYES: Eyes grossly normal to inspection, PERRL and conjunctivae and sclerae normal  NECK: no adenopathy, no asymmetry, masses, or scars  RESP: lungs clear to auscultation - no rales, rhonchi or wheezes  CV: regular rate and rhythm, normal S1 S2, no S3 or S4, no murmur, click or rub, no peripheral edema  MS: no gross musculoskeletal defects noted, no edema  SKIN: no suspicious lesions or rashes  NEURO: Normal strength and tone, mentation intact and speech normal  PSYCH: mentation appears normal, affect normal/bright        Signed Electronically by: Kavitha Jacobsen PA-C    "

## 2024-09-09 DIAGNOSIS — N45.1 CHRONIC EPIDIDYMITIS: ICD-10-CM

## 2024-09-09 RX ORDER — DOXYCYCLINE 100 MG/1
100 CAPSULE ORAL 2 TIMES DAILY
Qty: 56 CAPSULE | Refills: 1 | Status: SHIPPED | OUTPATIENT
Start: 2024-09-09 | End: 2024-10-07

## 2024-09-09 RX ORDER — DOXYCYCLINE 100 MG/1
100 CAPSULE ORAL 2 TIMES DAILY
Qty: 60 CAPSULE | Refills: 1 | OUTPATIENT
Start: 2024-09-09 | End: 2024-10-21

## 2024-10-03 ENCOUNTER — TRANSFERRED RECORDS (OUTPATIENT)
Dept: HEALTH INFORMATION MANAGEMENT | Facility: CLINIC | Age: 64
End: 2024-10-03
Payer: COMMERCIAL

## 2024-10-05 SDOH — HEALTH STABILITY: PHYSICAL HEALTH: ON AVERAGE, HOW MANY DAYS PER WEEK DO YOU ENGAGE IN MODERATE TO STRENUOUS EXERCISE (LIKE A BRISK WALK)?: 0 DAYS

## 2024-10-05 SDOH — HEALTH STABILITY: PHYSICAL HEALTH: ON AVERAGE, HOW MANY MINUTES DO YOU ENGAGE IN EXERCISE AT THIS LEVEL?: 10 MIN

## 2024-10-05 ASSESSMENT — SOCIAL DETERMINANTS OF HEALTH (SDOH): HOW OFTEN DO YOU GET TOGETHER WITH FRIENDS OR RELATIVES?: ONCE A WEEK

## 2024-10-09 ENCOUNTER — OFFICE VISIT (OUTPATIENT)
Dept: PEDIATRICS | Facility: CLINIC | Age: 64
End: 2024-10-09
Payer: COMMERCIAL

## 2024-10-09 VITALS
DIASTOLIC BLOOD PRESSURE: 66 MMHG | HEART RATE: 61 BPM | OXYGEN SATURATION: 100 % | BODY MASS INDEX: 25.69 KG/M2 | SYSTOLIC BLOOD PRESSURE: 110 MMHG | TEMPERATURE: 97.5 F | WEIGHT: 154.2 LBS | HEIGHT: 65 IN | RESPIRATION RATE: 16 BRPM

## 2024-10-09 DIAGNOSIS — D64.9 ANEMIA, UNSPECIFIED TYPE: ICD-10-CM

## 2024-10-09 DIAGNOSIS — Z11.59 NEED FOR HEPATITIS C SCREENING TEST: ICD-10-CM

## 2024-10-09 DIAGNOSIS — Q23.81 BICUSPID AORTIC VALVE: ICD-10-CM

## 2024-10-09 DIAGNOSIS — Z13.220 LIPID SCREENING: ICD-10-CM

## 2024-10-09 DIAGNOSIS — Z00.00 ROUTINE GENERAL MEDICAL EXAMINATION AT A HEALTH CARE FACILITY: Primary | ICD-10-CM

## 2024-10-09 DIAGNOSIS — H93.13 TINNITUS, BILATERAL: ICD-10-CM

## 2024-10-09 PROBLEM — U07.1 INFECTION DUE TO 2019 NOVEL CORONAVIRUS: Status: RESOLVED | Noted: 2022-05-16 | Resolved: 2024-10-09

## 2024-10-09 LAB
ALBUMIN SERPL BCG-MCNC: 4.3 G/DL (ref 3.5–5.2)
ALP SERPL-CCNC: 72 U/L (ref 40–150)
ALT SERPL W P-5'-P-CCNC: 17 U/L (ref 0–70)
ANION GAP SERPL CALCULATED.3IONS-SCNC: 8 MMOL/L (ref 7–15)
AST SERPL W P-5'-P-CCNC: 19 U/L (ref 0–45)
BASOPHILS # BLD AUTO: 0.1 10E3/UL (ref 0–0.2)
BASOPHILS NFR BLD AUTO: 1 %
BILIRUB SERPL-MCNC: 0.5 MG/DL
BUN SERPL-MCNC: 16 MG/DL (ref 8–23)
CALCIUM SERPL-MCNC: 9.2 MG/DL (ref 8.8–10.4)
CHLORIDE SERPL-SCNC: 108 MMOL/L (ref 98–107)
CHOLEST SERPL-MCNC: 155 MG/DL
CREAT SERPL-MCNC: 0.87 MG/DL (ref 0.67–1.17)
EGFRCR SERPLBLD CKD-EPI 2021: >90 ML/MIN/1.73M2
EOSINOPHIL # BLD AUTO: 0.1 10E3/UL (ref 0–0.7)
EOSINOPHIL NFR BLD AUTO: 1 %
ERYTHROCYTE [DISTWIDTH] IN BLOOD BY AUTOMATED COUNT: 13.1 % (ref 10–15)
FASTING STATUS PATIENT QL REPORTED: ABNORMAL
FASTING STATUS PATIENT QL REPORTED: NORMAL
GLUCOSE SERPL-MCNC: 97 MG/DL (ref 70–99)
HCO3 SERPL-SCNC: 26 MMOL/L (ref 22–29)
HCT VFR BLD AUTO: 39.6 % (ref 40–53)
HCV AB SERPL QL IA: NONREACTIVE
HDLC SERPL-MCNC: 43 MG/DL
HGB BLD-MCNC: 12.6 G/DL (ref 13.3–17.7)
IMM GRANULOCYTES # BLD: 0 10E3/UL
IMM GRANULOCYTES NFR BLD: 0 %
LDLC SERPL CALC-MCNC: 88 MG/DL
LYMPHOCYTES # BLD AUTO: 1.4 10E3/UL (ref 0.8–5.3)
LYMPHOCYTES NFR BLD AUTO: 27 %
MCH RBC QN AUTO: 31.9 PG (ref 26.5–33)
MCHC RBC AUTO-ENTMCNC: 31.8 G/DL (ref 31.5–36.5)
MCV RBC AUTO: 100 FL (ref 78–100)
MONOCYTES # BLD AUTO: 0.6 10E3/UL (ref 0–1.3)
MONOCYTES NFR BLD AUTO: 11 %
NEUTROPHILS # BLD AUTO: 3 10E3/UL (ref 1.6–8.3)
NEUTROPHILS NFR BLD AUTO: 60 %
NONHDLC SERPL-MCNC: 112 MG/DL
PLATELET # BLD AUTO: 197 10E3/UL (ref 150–450)
POTASSIUM SERPL-SCNC: 5 MMOL/L (ref 3.4–5.3)
PROT SERPL-MCNC: 7.1 G/DL (ref 6.4–8.3)
RBC # BLD AUTO: 3.95 10E6/UL (ref 4.4–5.9)
SODIUM SERPL-SCNC: 142 MMOL/L (ref 135–145)
TRIGL SERPL-MCNC: 120 MG/DL
WBC # BLD AUTO: 5.1 10E3/UL (ref 4–11)

## 2024-10-09 PROCEDURE — 36415 COLL VENOUS BLD VENIPUNCTURE: CPT | Performed by: INTERNAL MEDICINE

## 2024-10-09 PROCEDURE — 90677 PCV20 VACCINE IM: CPT | Performed by: INTERNAL MEDICINE

## 2024-10-09 PROCEDURE — 86803 HEPATITIS C AB TEST: CPT | Performed by: INTERNAL MEDICINE

## 2024-10-09 PROCEDURE — 80053 COMPREHEN METABOLIC PANEL: CPT | Performed by: INTERNAL MEDICINE

## 2024-10-09 PROCEDURE — 90471 IMMUNIZATION ADMIN: CPT | Performed by: INTERNAL MEDICINE

## 2024-10-09 PROCEDURE — 99396 PREV VISIT EST AGE 40-64: CPT | Mod: 25 | Performed by: INTERNAL MEDICINE

## 2024-10-09 PROCEDURE — 85025 COMPLETE CBC W/AUTO DIFF WBC: CPT | Performed by: INTERNAL MEDICINE

## 2024-10-09 PROCEDURE — 80061 LIPID PANEL: CPT | Performed by: INTERNAL MEDICINE

## 2024-10-09 RX ORDER — LISINOPRIL 2.5 MG/1
2.5 TABLET ORAL DAILY
Qty: 90 TABLET | Refills: 3 | Status: CANCELLED | OUTPATIENT
Start: 2024-10-09

## 2024-10-09 ASSESSMENT — PAIN SCALES - GENERAL: PAINLEVEL: NO PAIN (0)

## 2024-10-09 NOTE — PROGRESS NOTES
Preventive Care Visit  [facility] CRISTINO Leblanc MD, Internal Medicine - Pediatrics  Oct 9, 2024      Assessment & Plan     (Z00.00) Routine general medical examination at a health care facility  (primary encounter diagnosis)    (Q23.81) Bicuspid aortic valve  Comment: without aortic stenosis  Plan: annual cardiology follow-up, due for echo    (H93.13) Tinnitus, bilateral  Comment:      Plan: Adult Audiology  Referral          (D64.9) Anemia, unspecified type  Comment:    recent colonoscopy/normal apart from 1 hyperplastic polyp, rpt labwork  Plan: CBC with platelets and differential          (Z11.59) Need for hepatitis C screening test  Comment:   Plan: Hepatitis C Screen Reflex to HCV RNA Quant and         Genotype                     Counseling  Appropriate preventive services were addressed with this patient via screening, questionnaire, or discussion as appropriate for fall prevention, nutrition, physical activity, Tobacco-use cessation, social engagement, weight loss and cognition.  Checklist reviewing preventive services available has been given to the patient.  Reviewed patient's diet, addressing concerns and/or questions.           Chey Lewis is a 64 year old, presenting for the following:  Physical        10/9/2024     8:40 AM   Additional Questions   Roomed by Venita WALLIS   Accompanied by None         10/9/2024     8:40 AM   Patient Reported Additional Medications   Patient reports taking the following new medications lisinopril        Via the Health Maintenance questionnaire, the patient has reported the following services have been completed -Colonscopy: VIVIENNE Swenson 2024-10-03, this information has been sent to the abstraction team.  Health Care Directive    Patient does not have a Health Care Directive or Living Will: Patient states has Advance Directive and will bring in a copy to clinic.    HPI              10/5/2024   General Health   How would you rate your  overall physical health? Good   Feel stress (tense, anxious, or unable to sleep) To some extent      (!) STRESS CONCERN      10/5/2024   Nutrition   Three or more servings of calcium each day? (!) I DON'T KNOW   Diet: Regular (no restrictions)   How many servings of fruit and vegetables per day? (!) 2-3   How many sweetened beverages each day? 0-1            10/5/2024   Exercise   Days per week of moderate/strenous exercise 0 days   Average minutes spent exercising at this level 10 min      (!) EXERCISE CONCERN      10/5/2024   Social Factors   Frequency of gathering with friends or relatives Once a week   Worry food won't last until get money to buy more No   Food not last or not have enough money for food? No   Do you have housing? (Housing is defined as stable permanent housing and does not include staying ouside in a car, in a tent, in an abandoned building, in an overnight shelter, or couch-surfing.) Yes   Are you worried about losing your housing? No   Lack of transportation? No   Unable to get utilities (heat,electricity)? No            10/5/2024   Fall Risk   Fallen 2 or more times in the past year? No   Trouble with walking or balance? No             10/5/2024   Dental   Dentist two times every year? Yes            10/5/2024   TB Screening   Were you born outside of the [place]? No              Today's PHQ-2 Score:       2/7/2024     9:37 AM   PHQ-2 ( 1999 [employer])   Q1: Little interest or pleasure in doing things 0   Q2: Feeling down, depressed or hopeless 0   PHQ-2 Score 0         10/5/2024   Substance Use   Alcohol more than 3/day or more than 7/wk Not Applicable   Do you use any other substances recreationally? No        Social History     Tobacco Use    Smoking status: Never    Smokeless tobacco: Never   Vaping Use    Vaping status: Never Used   Substance Use Topics    Alcohol use: No    Drug use: No     Comment: caffeine 1/d             10/5/2024   One time HIV Screening   Previous HIV test? Yes           10/5/2024   STI Screening   New sexual partner(s) since last STI/HIV test? No      Last PSA:   PSA   Date Value Ref Range Status   2012 0.97 0 - 4 ug/L Final     ASCVD Risk   The ASCVD Risk score (Scot MANLEY, et al., 2019) failed to calculate for the following reasons:    Cannot find a previous HDL lab    Cannot find a previous total cholesterol lab           Reviewed and updated as needed this visit by Provider                    Patient Active Problem List   Diagnosis    Esophageal reflux    Bicuspid aortic valve     Past Surgical History:   Procedure Laterality Date    CARDIAC CATH ARTERIAL      age 8, dx bicuspid aortic valve    SURGICAL HISTORY OF -       excision annular pancreas lesion age 3days       Social History     Tobacco Use    Smoking status: Never    Smokeless tobacco: Never   Substance Use Topics    Alcohol use: No     Family History   Problem Relation Age of Onset    Cancer Father          55 of metastatic melanoma    Hypertension Father     Cardiovascular Mother         bradycardia, pacemaker    Hypertension Mother     Diabetes Brother     Cancer - colorectal No family hx of     Prostate Cancer No family hx of          Current Outpatient Medications   Medication Sig Dispense Refill    calcium carbonate (OS-RICHMOND 500 MG Council. CA) 500 MG tablet Take 500 mg by mouth 2 times daily Calcium Citrate      famotidine (PEPCID) 20 MG tablet Take 20 mg by mouth 2 times daily as needed      lisinopril (ZESTRIL) 2.5 MG tablet Take 1 tablet (2.5 mg) by mouth daily 90 tablet 3    multivitamin, therapeutic with minerals (THERA-VIT-M) TABS Take 1 tablet by mouth daily           Review of Systems  Constitutional, neuro, ENT, endocrine, pulmonary, cardiac, gastrointestinal, genitourinary, musculoskeletal, integument and psychiatric systems are negative, except as otherwise noted.     Objective    Exam  /66 (BP Location: Right arm, Patient Position: Sitting, Cuff Size: Adult Regular)   Pulse 61   " Temp 97.5  F (36.4  C) (Tympanic)   Resp 16   Ht 1.651 m (5' 5\")   Wt 69.9 kg (154 lb 3.2 oz)   SpO2 100%   BMI 25.66 kg/m     Estimated body mass index is 25.66 kg/m  as calculated from the following:    Height as of this encounter: 1.651 m (5' 5\").    Weight as of this encounter: 69.9 kg (154 lb 3.2 oz).    Physical Exam  GENERAL: alert and no distress  EYES: Eyes grossly normal to inspection, PERRL and conjunctivae and sclerae normal  HENT: ear canals and TM's normal, nose and mouth without ulcers or lesions  NECK: no adenopathy, no asymmetry, masses, or scars  RESP: lungs clear to auscultation - no rales, rhonchi or wheezes  CV: regular rate and rhythm, normal S1 S2, no S3 or S4, no murmur, click or rub, no peripheral edema  ABDOMEN: soft, nontender, no hepatosplenomegaly, no masses and bowel sounds normal  MS: no gross musculoskeletal defects noted, no edema  SKIN: no suspicious lesions or rashes  NEURO: Normal strength and tone, mentation intact and speech normal  PSYCH: mentation appears normal, affect normal/bright        Signed Electronically by: Paul Leblanc MD    "

## 2024-10-09 NOTE — PATIENT INSTRUCTIONS
Patient Education   Preventive Care Advice   This is general advice given by our system to help you stay healthy. However, your care team may have specific advice just for you. Please talk to your care team about your preventive care needs.  Nutrition  Eat 5 or more servings of fruits and vegetables each day.  Try wheat bread, brown rice and whole grain pasta (instead of white bread, rice, and pasta).  Get enough calcium and vitamin D. Check the label on foods and aim for 100% of the RDA (recommended daily allowance).  Lifestyle  Exercise at least 150 minutes each week  (30 minutes a day, 5 days a week).  Do muscle strengthening activities 2 days a week. These help control your weight and prevent disease.  No smoking.  Wear sunscreen to prevent skin cancer.  Have a dental exam and cleaning every 6 months.  Yearly exams  See your health care team every year to talk about:  Any changes in your health.  Any medicines your care team has prescribed.  Preventive care, family planning, and ways to prevent chronic diseases.  Shots (vaccines)   HPV shots (up to age 26), if you've never had them before.  Hepatitis B shots (up to age 59), if you've never had them before.  COVID-19 shot: Get this shot when it's due.  Flu shot: Get a flu shot every year.  Tetanus shot: Get a tetanus shot every 10 years.  Pneumococcal, hepatitis A, and RSV shots: Ask your care team if you need these based on your risk.  Shingles shot (for age 50 and up)  General health tests  Diabetes screening:  Starting at age 35, Get screened for diabetes at least every 3 years.  If you are younger than age 35, ask your care team if you should be screened for diabetes.  Cholesterol test: At age 39, start having a cholesterol test every 5 years, or more often if advised.  Bone density scan (DEXA): At age 50, ask your care team if you should have this scan for osteoporosis (brittle bones).  Hepatitis C: Get tested at least once in your life.  STIs (sexually  transmitted infections)  Before age 24: Ask your care team if you should be screened for STIs.  After age 24: Get screened for STIs if you're at risk. You are at risk for STIs (including HIV) if:  You are sexually active with more than one person.  You don't use condoms every time.  You or a partner was diagnosed with a sexually transmitted infection.  If you are at risk for HIV, ask about PrEP medicine to prevent HIV.  Get tested for HIV at least once in your life, whether you are at risk for HIV or not.  Cancer screening tests  Cervical cancer screening: If you have a cervix, begin getting regular cervical cancer screening tests starting at age 21.  Breast cancer scan (mammogram): If you've ever had breasts, begin having regular mammograms starting at age 40. This is a scan to check for breast cancer.  Colon cancer screening: It is important to start screening for colon cancer at age 45.  Have a colonoscopy test every 10 years (or more often if you're at risk) Or, ask your provider about stool tests like a FIT test every year or Cologuard test every 3 years.  To learn more about your testing options, visit:   .  For help making a decision, visit:   https://bit.ly/fs99514.  Prostate cancer screening test: If you have a prostate, ask your care team if a prostate cancer screening test (PSA) at age 55 is right for you.  Lung cancer screening: If you are a current or former smoker ages 50 to 80, ask your care team if ongoing lung cancer screenings are right for you.  For informational purposes only. Not to replace the advice of your health care provider. Copyright   2023 [facility]. All rights reserved. Clinically reviewed by the [facility]. [employer] 435551 - REV 01/24.  Eating Healthy Foods: Care Instructions  With every meal, you can make healthy food choices. Try to eat a variety of fruits, vegetables, whole grains, lean proteins, and low-fat dairy products. This can help  "you get the right balance of nutrients, including vitamins and minerals. Small changes add up over time. You can start by adding one healthy food to your meals each day.    Try to make half your plate fruits and vegetables, one-fourth whole grains, and one-fourth lean proteins. Try including dairy with your meals.   Eat more fruits and vegetables. Try to have them with most meals and snacks.   Foods for healthy eating        Fruits   These can be fresh, frozen, canned, or dried.  Try to choose whole fruit rather than fruit juice.  Eat a variety of colors.        Vegetables   These can be fresh, frozen, canned, or dried.  Beans, peas, and lentils count too.        Whole grains   Choose whole-grain breads, cereals, and noodles.  Try brown rice.        Lean proteins   These can include lean meat, poultry, fish, and eggs.  You can also have tofu, beans, peas, lentils, nuts, and seeds.        Dairy   Try milk, yogurt, and cheese.  Choose low-fat or fat-free when you can.  If you need to, use lactose-free milk or fortified plant-based milk products, such as soy milk.        Water   Drink water when you're thirsty.  Limit sugar-sweetened drinks, including soda, fruit drinks, and sports drinks.  Where can you learn more?  Go to https://www.[employer].net/patiented  Enter T756 in the search box to learn more about \"Eating Healthy Foods: Care Instructions.\"  Current as of: September 20, 2023  Content Version: 14.2 2024 [facility] [employer].   Care instructions adapted under license by your healthcare professional. If you have questions about a medical condition or this instruction, always ask your healthcare professional. [employer] disclaims any warranty or liability for your use of this information.       "

## 2024-10-15 ENCOUNTER — MYC MEDICAL ADVICE (OUTPATIENT)
Dept: PEDIATRICS | Facility: CLINIC | Age: 64
End: 2024-10-15
Payer: COMMERCIAL

## 2024-10-15 NOTE — TELEPHONE ENCOUNTER
Patient requesting referral be sent to different location.  - printed Audiology referral and faxed to [facility] Attn: Dr. Harden @ [phone]  - replied to MyChart with info.      Simran ANDINO, - [place] [address]  [facility]

## 2024-10-16 ENCOUNTER — TRANSFERRED RECORDS (OUTPATIENT)
Dept: HEALTH INFORMATION MANAGEMENT | Facility: CLINIC | Age: 64
End: 2024-10-16
Payer: COMMERCIAL

## 2024-10-17 ENCOUNTER — E-VISIT (OUTPATIENT)
Dept: PEDIATRICS | Facility: CLINIC | Age: 64
End: 2024-10-17
Payer: COMMERCIAL

## 2024-10-17 ENCOUNTER — MYC MEDICAL ADVICE (OUTPATIENT)
Dept: CARDIOLOGY | Facility: CLINIC | Age: 64
End: 2024-10-17

## 2024-10-17 DIAGNOSIS — H53.9 VISION CHANGES: Primary | ICD-10-CM

## 2024-10-17 DIAGNOSIS — Q23.81 BICUSPID AORTIC VALVE: ICD-10-CM

## 2024-10-17 DIAGNOSIS — H57.9 VISUAL SYMPTOMS: Primary | ICD-10-CM

## 2024-10-17 PROCEDURE — 99422 OL DIG E/M SVC 11-20 MIN: CPT | Performed by: INTERNAL MEDICINE

## 2024-10-18 ENCOUNTER — ORDERS ONLY (AUTO-RELEASED) (OUTPATIENT)
Dept: CARDIOLOGY | Facility: CLINIC | Age: 64
End: 2024-10-18

## 2024-10-18 ENCOUNTER — LAB (OUTPATIENT)
Dept: LAB | Facility: CLINIC | Age: 64
End: 2024-10-18
Payer: COMMERCIAL

## 2024-10-18 DIAGNOSIS — H57.9 VISUAL SYMPTOMS: ICD-10-CM

## 2024-10-18 DIAGNOSIS — H53.9 VISION CHANGES: ICD-10-CM

## 2024-10-18 LAB — ERYTHROCYTE [SEDIMENTATION RATE] IN BLOOD BY WESTERGREN METHOD: 39 MM/HR (ref 0–20)

## 2024-10-18 PROCEDURE — 85652 RBC SED RATE AUTOMATED: CPT

## 2024-10-18 PROCEDURE — 36415 COLL VENOUS BLD VENIPUNCTURE: CPT

## 2024-10-21 ENCOUNTER — ANCILLARY PROCEDURE (OUTPATIENT)
Dept: ULTRASOUND IMAGING | Facility: CLINIC | Age: 64
End: 2024-10-21
Attending: INTERNAL MEDICINE
Payer: COMMERCIAL

## 2024-10-21 DIAGNOSIS — H57.9 VISUAL SYMPTOMS: ICD-10-CM

## 2024-10-21 PROCEDURE — 93880 EXTRACRANIAL BILAT STUDY: CPT

## 2024-10-24 ENCOUNTER — MYC MEDICAL ADVICE (OUTPATIENT)
Dept: PEDIATRICS | Facility: CLINIC | Age: 64
End: 2024-10-24
Payer: COMMERCIAL

## 2024-10-24 ENCOUNTER — TRANSFERRED RECORDS (OUTPATIENT)
Dept: HEALTH INFORMATION MANAGEMENT | Facility: CLINIC | Age: 64
End: 2024-10-24
Payer: COMMERCIAL

## 2024-10-24 DIAGNOSIS — I65.23 CAROTID ATHEROSCLEROSIS, BILATERAL: Primary | ICD-10-CM

## 2024-10-24 DIAGNOSIS — I51.89 OTHER ILL-DEFINED HEART DISEASES: ICD-10-CM

## 2024-10-24 RX ORDER — ASPIRIN 81 MG/1
81 TABLET ORAL DAILY
COMMUNITY
Start: 2024-10-24

## 2024-10-24 NOTE — TELEPHONE ENCOUNTER
"See patient's MyChart message   - Patient wondering if it is ok to take aspirin 81 mg while taking lisinopril    lisinopril (ZESTRIL) 2.5 MG tablet 90 tablet 3 2/7/2024 -- No   Sig - Route: Take 1 tablet (2.5 mg) by mouth daily - Oral     Per [employer]: \"Concurrent use of ASPIRIN and ANGIOTENSIN CONVERTING ENZYME INHIBITORS may result in reduced hyponatremic and hypotensive effects of ACE inhibitors.\"    Dr. Leblanc, please review and advise.     Ivon CHOI RN   [facility]   "

## 2024-11-07 ENCOUNTER — HOSPITAL ENCOUNTER (OUTPATIENT)
Dept: CARDIOLOGY | Facility: CLINIC | Age: 64
Discharge: HOME | End: 2024-11-07
Attending: INTERNAL MEDICINE | Admitting: INTERNAL MEDICINE
Payer: COMMERCIAL

## 2024-11-07 DIAGNOSIS — I51.89 OTHER ILL-DEFINED HEART DISEASES: ICD-10-CM

## 2024-11-07 LAB — LVEF ECHO: NORMAL

## 2024-11-07 PROCEDURE — 93306 TTE W/DOPPLER COMPLETE: CPT | Mod: 26 | Performed by: INTERNAL MEDICINE

## 2024-11-07 PROCEDURE — 255N000002 HC RX 255 OP 636: Performed by: INTERNAL MEDICINE

## 2024-11-07 PROCEDURE — 258N000001 HC RX 258: Performed by: INTERNAL MEDICINE

## 2024-11-07 PROCEDURE — 999N000208 ECHOCARDIOGRAM COMPLETE

## 2024-11-07 RX ORDER — ACYCLOVIR 200 MG/1
10 CAPSULE ORAL ONCE
Status: COMPLETED | OUTPATIENT
Start: 2024-11-07 | End: 2024-11-07

## 2024-11-07 RX ADMIN — HUMAN ALBUMIN MICROSPHERES AND PERFLUTREN 6 ML: 10; .22 INJECTION, SOLUTION INTRAVENOUS at 10:35

## 2024-11-07 RX ADMIN — SODIUM CHLORIDE 10 ML: 9 INJECTION INTRAMUSCULAR; INTRAVENOUS; SUBCUTANEOUS at 10:35

## 2024-11-09 ENCOUNTER — OFFICE VISIT (OUTPATIENT)
Dept: URGENT CARE | Facility: URGENT CARE | Age: 64
End: 2024-11-09
Payer: COMMERCIAL

## 2024-11-09 VITALS
SYSTOLIC BLOOD PRESSURE: 106 MMHG | HEART RATE: 68 BPM | OXYGEN SATURATION: 98 % | DIASTOLIC BLOOD PRESSURE: 70 MMHG | WEIGHT: 158.7 LBS | BODY MASS INDEX: 26.41 KG/M2 | TEMPERATURE: 97.8 F

## 2024-11-09 DIAGNOSIS — N45.1 CHRONIC EPIDIDYMITIS: Primary | ICD-10-CM

## 2024-11-09 DIAGNOSIS — N50.812 TESTICULAR PAIN, LEFT: ICD-10-CM

## 2024-11-09 PROCEDURE — 99214 OFFICE O/P EST MOD 30 MIN: CPT | Performed by: PHYSICIAN ASSISTANT

## 2024-11-09 RX ORDER — ACETAMINOPHEN 500 MG
500-1000 TABLET ORAL EVERY 6 HOURS PRN
COMMUNITY

## 2024-11-09 RX ORDER — CIPROFLOXACIN 500 MG/1
500 TABLET, FILM COATED ORAL 2 TIMES DAILY
Qty: 28 TABLET | Refills: 0 | Status: SHIPPED | OUTPATIENT
Start: 2024-11-09 | End: 2024-11-23

## 2024-11-09 RX ORDER — DOXYCYCLINE 100 MG/1
CAPSULE ORAL
COMMUNITY
Start: 2024-11-01

## 2024-11-09 NOTE — PROGRESS NOTES
Assessment & Plan     Chronic epididymitis    Patient is experiencing his 3rd flare up this year  This time alma isnt helping  Start on cipro for epididymitis    Due to reoccurring epididymitis will start on Cipro  Referral to Urology    Epididymitis is pain and swelling of the tube that is attached to each testicle. This tube is called the epididymis. Orchitis is pain and swelling of the testicle. Infection with bacteria often causes these conditions. Sexually transmitted infections (STIs) also can cause both conditions. This is often the case in males younger than 35. Other causes are infections from surgery or having a catheter that drains urine. The mumps virus also can cause orchitis.  Anti-inflammatory or pain medicines can help with the pain. Antibiotics are used if the problem is caused by bacteria. They are not used if a virus is the cause. Your testicle may stay swollen for many days or even a few weeks.    - ciprofloxacin (CIPRO) 500 MG tablet  Dispense: 28 tablet; Refill: 0  - Adult Urology  Referral  - US Testicular & Scrotum w Doppler Ltd    Testicular pain, left    Tylenol for left testicular pain  Cipro for infection    Order testicular ultrasound outpatient to follow to make sure nothing has developed in testicle to predispose him to recurrent infections  - ciprofloxacin (CIPRO) 500 MG tablet  Dispense: 28 tablet; Refill: 0       Referral to Urology    No follow-ups on file.    Seymour Garrett, [facility], PA-C  [facility] CRISTINO Lewis is a 64 year old male who presents to clinic today for the following health issues:  Chief Complaint   Patient presents with    Epididymitis     Start a little over a week sx left testicle, low level ache, discomfort, pressure, discomfort in abdominal muscles hx of Epididymitis tx taking old prescription of doxycycline and extra strength Tylenol and Baby aspirin          11/9/2024     3:21 PM   Additional Questions   Roomed by ana    Accompanied by self     HPI  Review of Systems  Constitutional, HEENT, cardiovascular, pulmonary, gi and gu systems are negative, except as otherwise noted.      Objective    /70   Pulse 68   Temp 97.8  F (36.6  C)   Wt 72 kg (158 lb 11.2 oz)   SpO2 98%   BMI 26.41 kg/m    Physical Exam   GENERAL: alert and no distress  ABDOMEN: soft, nontender, no hepatosplenomegaly, no masses and bowel sounds normal   (male): penis normal without urethral discharge and tenderness to palpation left epididymis  MS: no gross musculoskeletal defects noted, no edema  SKIN: no suspicious lesions or rashes  NEURO: Normal strength and tone, mentation intact and speech normal  PSYCH: mentation appears normal, affect normal/bright

## 2024-11-10 PROBLEM — I77.810 AORTIC ROOT DILATATION (H): Status: ACTIVE | Noted: 2024-11-10

## 2024-11-11 ENCOUNTER — MYC MEDICAL ADVICE (OUTPATIENT)
Dept: PEDIATRICS | Facility: CLINIC | Age: 64
End: 2024-11-11
Payer: COMMERCIAL

## 2024-11-11 ENCOUNTER — E-VISIT (OUTPATIENT)
Dept: PEDIATRICS | Facility: CLINIC | Age: 64
End: 2024-11-11
Payer: COMMERCIAL

## 2024-11-11 DIAGNOSIS — L24.9 IRRITANT CONTACT DERMATITIS, UNSPECIFIED TRIGGER: Primary | ICD-10-CM

## 2024-11-11 PROCEDURE — 93248 EXT ECG>7D<15D REV&INTERPJ: CPT | Performed by: INTERNAL MEDICINE

## 2024-11-11 NOTE — TELEPHONE ENCOUNTER
In addition to e-visit patient submitted, patient's rash is where the ziopatches were located.  Luciana Cabral RN

## 2024-11-11 NOTE — TELEPHONE ENCOUNTER
RN recommended E-visit (per clinic policy) in order to address patient request for medical advice.       Luciana Cabral RN

## 2024-11-12 RX ORDER — TRIAMCINOLONE ACETONIDE 1 MG/G
CREAM TOPICAL 2 TIMES DAILY
Qty: 45 G | Refills: 2 | Status: SHIPPED | OUTPATIENT
Start: 2024-11-12

## 2024-11-13 ENCOUNTER — LAB (OUTPATIENT)
Dept: LAB | Facility: CLINIC | Age: 64
End: 2024-11-13
Payer: COMMERCIAL

## 2024-11-13 DIAGNOSIS — D64.9 ANEMIA, UNSPECIFIED TYPE: ICD-10-CM

## 2024-11-13 LAB
BASOPHILS # BLD AUTO: 0 10E3/UL (ref 0–0.2)
BASOPHILS NFR BLD AUTO: 1 %
EOSINOPHIL # BLD AUTO: 0.2 10E3/UL (ref 0–0.7)
EOSINOPHIL NFR BLD AUTO: 4 %
ERYTHROCYTE [DISTWIDTH] IN BLOOD BY AUTOMATED COUNT: 13 % (ref 10–15)
HCT VFR BLD AUTO: 38.3 % (ref 40–53)
HGB BLD-MCNC: 12.2 G/DL (ref 13.3–17.7)
IMM GRANULOCYTES # BLD: 0 10E3/UL
IMM GRANULOCYTES NFR BLD: 0 %
LYMPHOCYTES # BLD AUTO: 1.3 10E3/UL (ref 0.8–5.3)
LYMPHOCYTES NFR BLD AUTO: 32 %
MCH RBC QN AUTO: 31.7 PG (ref 26.5–33)
MCHC RBC AUTO-ENTMCNC: 31.9 G/DL (ref 31.5–36.5)
MCV RBC AUTO: 100 FL (ref 78–100)
MONOCYTES # BLD AUTO: 0.5 10E3/UL (ref 0–1.3)
MONOCYTES NFR BLD AUTO: 13 %
NEUTROPHILS # BLD AUTO: 2 10E3/UL (ref 1.6–8.3)
NEUTROPHILS NFR BLD AUTO: 50 %
PLATELET # BLD AUTO: 195 10E3/UL (ref 150–450)
RBC # BLD AUTO: 3.85 10E6/UL (ref 4.4–5.9)
WBC # BLD AUTO: 4 10E3/UL (ref 4–11)

## 2024-11-13 PROCEDURE — 85025 COMPLETE CBC W/AUTO DIFF WBC: CPT

## 2024-11-13 PROCEDURE — 36415 COLL VENOUS BLD VENIPUNCTURE: CPT

## 2024-11-25 ENCOUNTER — HOSPITAL ENCOUNTER (OUTPATIENT)
Dept: ULTRASOUND IMAGING | Facility: CLINIC | Age: 64
Discharge: HOME | End: 2024-11-25
Attending: PHYSICIAN ASSISTANT | Admitting: PHYSICIAN ASSISTANT
Payer: COMMERCIAL

## 2024-11-25 DIAGNOSIS — N45.1 CHRONIC EPIDIDYMITIS: ICD-10-CM

## 2024-11-25 PROCEDURE — 93976 VASCULAR STUDY: CPT

## 2024-12-20 ENCOUNTER — ANCILLARY PROCEDURE (OUTPATIENT)
Dept: GENERAL RADIOLOGY | Facility: CLINIC | Age: 64
End: 2024-12-20
Attending: FAMILY MEDICINE
Payer: COMMERCIAL

## 2024-12-20 PROCEDURE — 71046 X-RAY EXAM CHEST 2 VIEWS: CPT | Mod: TC | Performed by: RADIOLOGY

## 2025-02-25 ENCOUNTER — MYC MEDICAL ADVICE (OUTPATIENT)
Dept: PEDIATRICS | Facility: CLINIC | Age: 65
End: 2025-02-25
Payer: COMMERCIAL

## 2025-02-25 DIAGNOSIS — I10 BENIGN ESSENTIAL HYPERTENSION: ICD-10-CM

## 2025-02-25 RX ORDER — LISINOPRIL 2.5 MG/1
2.5 TABLET ORAL DAILY
Qty: 90 TABLET | Refills: 3 | Status: SHIPPED | OUTPATIENT
Start: 2025-02-25

## 2025-02-25 NOTE — TELEPHONE ENCOUNTER
See patient's MyChart message   - Patient wondering if Dr. Leblanc will take over management of his lisinopril medication and authorize a refill at this time     lisinopril (ZESTRIL) 2.5 MG tablet 90 tablet 3 2/7/2024 -- No   Sig - Route: Take 1 tablet (2.5 mg) by mouth daily - Oral     - Pended the patient's lisinopril (ZESTRIL) 2.5 MG tablet medication     Dr. Leblanc, please review and refill if willing to take over management of the patient's lisinopril medication.     Ivon CHOI RN   [facility]

## 2025-03-14 ENCOUNTER — LAB (OUTPATIENT)
Dept: LAB | Facility: CLINIC | Age: 65
End: 2025-03-14
Payer: COMMERCIAL

## 2025-03-14 DIAGNOSIS — Z12.5 ENCOUNTER FOR SCREENING PROSTATE SPECIFIC ANTIGEN (PSA) MEASUREMENT: ICD-10-CM

## 2025-03-14 PROCEDURE — 36415 COLL VENOUS BLD VENIPUNCTURE: CPT

## 2025-03-14 PROCEDURE — G0103 PSA SCREENING: HCPCS

## 2025-03-15 LAB — PSA SERPL DL<=0.01 NG/ML-MCNC: 1 NG/ML (ref 0–4.5)

## 2025-05-27 ENCOUNTER — OFFICE VISIT (OUTPATIENT)
Dept: URGENT CARE | Facility: URGENT CARE | Age: 65
End: 2025-05-27
Payer: COMMERCIAL

## 2025-05-27 VITALS
HEIGHT: 66 IN | DIASTOLIC BLOOD PRESSURE: 74 MMHG | TEMPERATURE: 96.8 F | WEIGHT: 162 LBS | BODY MASS INDEX: 26.03 KG/M2 | HEART RATE: 62 BPM | RESPIRATION RATE: 22 BRPM | SYSTOLIC BLOOD PRESSURE: 123 MMHG | OXYGEN SATURATION: 100 %

## 2025-05-27 DIAGNOSIS — R25.2 CALF CRAMP: ICD-10-CM

## 2025-05-27 DIAGNOSIS — M79.662 PAIN OF LEFT CALF: Primary | ICD-10-CM

## 2025-05-27 LAB
ANION GAP SERPL CALCULATED.3IONS-SCNC: 13 MMOL/L (ref 3–14)
BUN SERPL-MCNC: 18 MG/DL (ref 7–30)
CALCIUM SERPL-MCNC: 9.4 MG/DL (ref 8.8–10.4)
CHLORIDE BLD-SCNC: 105 MMOL/L (ref 94–109)
CO2 SERPL-SCNC: 24 MMOL/L (ref 20–32)
CREAT SERPL-MCNC: 0.86 MG/DL (ref 0.67–1.17)
D DIMER PPP FEU-MCNC: <0.27 UG/ML FEU (ref 0–0.5)
EGFRCR SERPLBLD CKD-EPI 2021: >90 ML/MIN/1.73M2
ERYTHROCYTE [SEDIMENTATION RATE] IN BLOOD BY WESTERGREN METHOD: 42 MM/HR (ref 0–20)
GLUCOSE BLD-MCNC: 88 MG/DL (ref 70–99)
POTASSIUM BLD-SCNC: 4 MMOL/L (ref 3.4–5.3)
SODIUM SERPL-SCNC: 142 MMOL/L (ref 135–145)

## 2025-05-27 PROCEDURE — 99214 OFFICE O/P EST MOD 30 MIN: CPT | Performed by: PHYSICIAN ASSISTANT

## 2025-05-27 PROCEDURE — 36415 COLL VENOUS BLD VENIPUNCTURE: CPT | Performed by: PHYSICIAN ASSISTANT

## 2025-05-27 PROCEDURE — 85379 FIBRIN DEGRADATION QUANT: CPT | Performed by: PHYSICIAN ASSISTANT

## 2025-05-27 PROCEDURE — 85652 RBC SED RATE AUTOMATED: CPT | Performed by: PHYSICIAN ASSISTANT

## 2025-05-27 PROCEDURE — 80048 BASIC METABOLIC PNL TOTAL CA: CPT | Performed by: PHYSICIAN ASSISTANT

## 2025-05-27 RX ORDER — NAPROXEN 500 MG/1
500 TABLET ORAL 2 TIMES DAILY WITH MEALS
Qty: 20 TABLET | Refills: 0 | Status: SHIPPED | OUTPATIENT
Start: 2025-05-27

## 2025-05-27 RX ORDER — CYCLOBENZAPRINE HCL 5 MG
5 TABLET ORAL 3 TIMES DAILY PRN
Qty: 21 TABLET | Refills: 0 | Status: SHIPPED | OUTPATIENT
Start: 2025-05-27

## 2025-05-27 ASSESSMENT — PAIN SCALES - GENERAL: PAINLEVEL_OUTOF10: MILD PAIN (3)

## 2025-05-27 NOTE — PROGRESS NOTES
Urgent Care Clinic Visit    Chief Complaint   Patient presents with    Urgent Care     L calf pain started this morning/has a small bruise on R hand near thumb-no injury               5/27/2025    10:19 AM   Additional Questions   Roomed by Zeke   Accompanied by self

## 2025-05-27 NOTE — PROGRESS NOTES
Assessment & Plan     Pain of left calf    D-dimer is NEG for DVT  BMP is neg for electrolyte concerns  ESR is elevated, this has been elevated in the past    Symptomatic treatment with naprosyn  - naproxen (NAPROSYN) 500 MG tablet  Dispense: 20 tablet; Refill: 0      Calf cramp    A muscle cramp occurs when a muscle tightens up suddenly. A cramp often happens in the legs. A muscle cramp is also called a muscle spasm or a charley horse.  Muscle cramps usually last less than a minute. However, the pain may last for several minutes. Leg cramps that occur at night may wake you up.  Heavy exercise, dehydration, and being overweight can increase your risk of getting cramps. An imbalance of certain chemicals in your blood, called electrolytes, can also lead to muscle cramps. People who are pregnant sometimes get muscle cramps during sleep.    - cyclobenzaprine (FLEXERIL) 5 MG tablet  Dispense: 21 tablet; Refill: 0       At today's visit with Joshua ANDINO Santiago , we discussed results, diagnosis, medications and formulated a plan.  We also discussed red flags for immediate return to clinic/ER, as well as indications for follow up with PCP if not improved in 3 days. Patient understood and agreed to plan. Joshua E Santiago was discharged with stable vitals and has no further questions.       No follow-ups on file.    Seymour Garrett, [facility], PA-C  [facility] CRISTINO Lewis is a 64 year old male who presents to clinic today for the following health issues:  Chief Complaint   Patient presents with    Urgent Care     L calf pain started this morning/has a small bruise on R hand near thumb-no injury         5/27/2025    10:19 AM   Additional Questions   Roomed by Zeke   Accompanied by self     HPI  Review of Systems  Constitutional, HEENT, cardiovascular, pulmonary, gi and gu systems are negative, except as otherwise noted.      Objective    /74   Pulse 62   Temp 96.8  F (36  C) (Tympanic)   " Resp 22   Ht 1.676 m (5' 6\")   Wt 73.5 kg (162 lb)   SpO2 100%   BMI 26.15 kg/m    Physical Exam   GENERAL: alert and no distress  EYES: Eyes grossly normal to inspection, PERRL and conjunctivae and sclerae normal  HENT: ear canals and TM's normal, nose and mouth without ulcers or lesions  NECK: no adenopathy, no asymmetry, masses, or scars  RESP: lungs clear to auscultation - no rales, rhonchi or wheezes  CV: regular rate and rhythm, normal S1 S2, no S3 or S4, no murmur, click or rub, no peripheral edema  MS: pos for left side calf tenderness  SKIN: no suspicious lesions or rashes  NEURO: Normal strength and tone, mentation intact and speech normal  PSYCH: mentation appears normal, affect normal/bright      Results for orders placed or performed in visit on 05/27/25   D dimer quantitative     Status: Normal   Result Value Ref Range    D-Dimer Quantitative <0.27 0.00 - 0.50 ug/mL FEU    Narrative    This D-dimer assay is intended for use in conjunction with a clinical pretest probability assessment model to exclude pulmonary embolism (PE) and deep venous thrombosis (DVT) in outpatients suspected of PE or DVT. The cut-off value is 0.50 ug/mL FEU.    For patients 50 years of age or older, the application of age-adjusted cut-off values for D-Dimer may increase the specificity without significant effect on sensitivity. The literature suggested calculation age adjusted cut-off in ug/L = age in years x 10 ug/L. The results in this laboratory are reported as ug/mL rather than ug/L. The calculation for age adjusted cut off in ug/mL= age in years x 0.01 ug/mL. For example, the cut off for a 76 year old male is 76 x 0.01 ug/mL = 0.76 ug/mL (760 ug/L).    M Charlotte et al. Age adjusted D-dimer cut-off levels to rule out pulmonary embolism: The ADJUST-PE Study. HE 2014;311:8327-4859.; HJ Compa et al. Diagnostic accuracy of conventional or age adjusted D-dimer cutoff values in older patients with suspected venous " thromboembolism. Systemic review and meta-analysis. BMJ 2013:346:f2492.   Basic metabolic panel  (Ca, Cl, CO2, Creat, Gluc, K, Na, BUN)     Status: Normal   Result Value Ref Range    Sodium 142 135 - 145 mmol/L    Potassium 4.0 3.4 - 5.3 mmol/L    Chloride 105 94 - 109 mmol/L    Carbon Dioxide (CO2) 24 20 - 32 mmol/L    Anion Gap 13 3 - 14 mmol/L    Urea Nitrogen 18 7 - 30 mg/dL    Creatinine 0.86 0.67 - 1.17 mg/dL    GFR Estimate >90 >60 mL/min/1.73m2    Calcium 9.4 8.8 - 10.4 mg/dL    Glucose 88 70 - 99 mg/dL   ESR: Erythrocyte sedimentation rate     Status: Abnormal   Result Value Ref Range    Erythrocyte Sedimentation Rate 42 (H) 0 - 20 mm/hr

## 2025-07-15 ENCOUNTER — MYC MEDICAL ADVICE (OUTPATIENT)
Dept: PEDIATRICS | Facility: CLINIC | Age: 65
End: 2025-07-15
Payer: COMMERCIAL

## 2025-07-15 NOTE — TELEPHONE ENCOUNTER
Contacts       Contact Date/Time Type Contact Phone/Fax    07/15/2025 12:17 PM CDT Phone (Outgoing) Joshua Henderson (Self) [phone] (M)    Talked with Patient           Spoke with pt and scheduled.    Karlene Bellamy on 7/15/2025 at 12:17 PM

## 2025-07-15 NOTE — TELEPHONE ENCOUNTER
TC, please assist the patient in scheduling an appointment with the travel clinic.     Ivon CHOI RN   [facility]

## 2025-07-28 ENCOUNTER — MYC MEDICAL ADVICE (OUTPATIENT)
Dept: PEDIATRICS | Facility: CLINIC | Age: 65
End: 2025-07-28

## 2025-07-28 ENCOUNTER — OFFICE VISIT (OUTPATIENT)
Dept: FAMILY MEDICINE | Facility: CLINIC | Age: 65
End: 2025-07-28
Payer: COMMERCIAL

## 2025-07-28 VITALS
HEIGHT: 66 IN | HEART RATE: 68 BPM | WEIGHT: 159.1 LBS | BODY MASS INDEX: 25.57 KG/M2 | TEMPERATURE: 98.1 F | OXYGEN SATURATION: 98 % | RESPIRATION RATE: 20 BRPM | SYSTOLIC BLOOD PRESSURE: 114 MMHG | DIASTOLIC BLOOD PRESSURE: 73 MMHG

## 2025-07-28 DIAGNOSIS — Z23 NEED FOR MMR VACCINE: ICD-10-CM

## 2025-07-28 DIAGNOSIS — Z23 NEED FOR COVID-19 VACCINE: ICD-10-CM

## 2025-07-28 DIAGNOSIS — Z71.84 ENCOUNTER FOR COUNSELING FOR TRAVEL: Primary | ICD-10-CM

## 2025-07-28 PROCEDURE — 90707 MMR VACCINE SC: CPT | Mod: GA | Performed by: PHYSICIAN ASSISTANT

## 2025-07-28 PROCEDURE — 90471 IMMUNIZATION ADMIN: CPT | Mod: GA | Performed by: PHYSICIAN ASSISTANT

## 2025-07-28 PROCEDURE — 99401 PREV MED CNSL INDIV APPRX 15: CPT | Mod: 25 | Performed by: PHYSICIAN ASSISTANT

## 2025-07-28 PROCEDURE — 90480 ADMN SARSCOV2 VAC 1/ONLY CMP: CPT | Performed by: PHYSICIAN ASSISTANT

## 2025-07-28 PROCEDURE — 91320 SARSCV2 VAC 30MCG TRS-SUC IM: CPT | Performed by: PHYSICIAN ASSISTANT

## 2025-07-28 ASSESSMENT — PAIN SCALES - GENERAL: PAINLEVEL_OUTOF10: NO PAIN (0)

## 2025-07-28 NOTE — PROGRESS NOTES
Prior to immunization administration, verified patients identity using patient s name and date of birth. Please see Immunization Activity for additional information.     Screening Questionnaire for Adult Immunization    Are you sick today?   No   Do you have allergies to medications, food, a vaccine component or latex?   Yes   Have you ever had a serious reaction after receiving a vaccination?   Yes   Do you have a long-term health problem with heart, lung, kidney, or metabolic disease (e.g., diabetes), asthma, a blood disorder, no spleen, complement component deficiency, a cochlear implant, or a spinal fluid leak?  Are you on long-term aspirin therapy?   Yes   Do you have cancer, leukemia, HIV/AIDS, or any other immune system problem?   No   Do you have a parent, brother, or sister with an immune system problem?   No   In the past 3 months, have you taken medications that affect  your immune system, such as prednisone, other steroids, or anticancer drugs; drugs for the treatment of rheumatoid arthritis, Crohn s disease, or psoriasis; or have you had radiation treatments?   No   Have you had a seizure, or a brain or other nervous system problem?   No   During the past year, have you received a transfusion of blood or blood    products, or been given immune (gamma) globulin or antiviral drug?   No   For women: Are you pregnant or is there a chance you could become       pregnant during the next month?   No   Have you received any vaccinations in the past 4 weeks?   No

## 2025-07-28 NOTE — PROGRESS NOTES
"  {PROVIDER CHARTING PREFERENCE:996289}    Chey Lewis is a 64 year old, presenting for the following health issues:  Travel Clinic        7/28/2025     9:25 AM   Additional Questions   Roomed by rhs   Accompanied by self         7/28/2025   Forms   Any forms needing to be completed Yes     HPI        Patient is here today for travel clinic. He will be traveling to the [facility] in the first week of September.     {MA/LPN/RN Pre-Provider Visit Orders- hCG/UA/Strep (Optional):315144}  {SUPERLIST (Optional):695006}  {additonal problems for provider to add (Optional):466647}    {ROS Picklists (Optional):526343}      Objective    /73 (BP Location: Right arm, Patient Position: Sitting, Cuff Size: Adult Regular)   Pulse 68   Temp 98.1  F (36.7  C) (Oral)   Resp 20   Ht 1.676 m (5' 6\")   Wt 72.2 kg (159 lb 1.6 oz)   SpO2 98%   BMI 25.68 kg/m    Body mass index is 25.68 kg/m .  Physical Exam   {Exam List (Optional):191906}    {Diagnostic Test Results (Optional):556810}        Signed Electronically by: Dayton Aden PA-C  {Email feedback regarding this note to [email or web address]   :[id number]}  "

## 2025-07-28 NOTE — PROGRESS NOTES
SUBJECTIVE: Joshua Henderson , a 64 year old  male, presents for counseling and information regarding upcoming travel to [facility]. Special medical concerns include: none. He anticipates the following unusual exposures: none.    Itinerary:  [facility] [place] ([facility] and [facility])    Departure Date: 09/01/2025 Return date: 09/05/2025    Reason for travel (i.e. Business, pleasure): Pleasure    Visiting an urban or rural area?: both    Accommodations (i.e. hotel, hostel, friends, family, etc): hotel, ship ([employer])    Women - First day of your last period: N/A    IMMUNIZATION HISTORY  Have you received any vaccinations in the past 4 weeks? If so, which? No  Have you ever fainted from having your blood drawn or from an injection?  Yes  Have you ever had any bad reaction or side effect from any vaccination?  If so, which? No  Do you live (or work closely) with anyone who has AIDS, an AIDS-like condition, any other immune disorder or who is on chemotherapy for cancer?  No  Have you received any injection of immune globulin or any blood products during the past 12 months?  No    GENERAL MEDICAL HISTORY  Do you have a medical condition that requires medicine or doctor follow-up visits?  Yes  Do you have a medical condition that is stable now, but that may recur while traveling?  No  Has your spleen been removed?  No  Have you had an illness or a fever in the past 48 hours?  No  Are you pregnant, or might you become pregnant on this trip?  Any chance of pregnancy?  No  Are you breastfeeding?  No  Do you have HIV, AIDS, an AIDS-like condition, any other immune disorder, leukemia or cancer?  No  Have you had your thymus gland removed or history of problems with your thymus, such as myasthenia gravis, DiGeorge syndrome, or thymoma?  No  Do you have a severely low platelet count (thrombocytopenia) or a blood clotting disorder?  No  Have you ever had a convulsion, seizure, epilepsy,  neurologic condition or brain infection?  No  Do you have any stomach conditions?  Yes  Do you have severe renal or kidney impairment?  No  Do you have a history of mental health concerns?  No  Do you get yeast infections often?  No  Do you have psoriasis?  No  Do you get motion sickness?  No  Have you ever had headaches, nausea, vomiting, or breathing problems from altitude exposure?  No    MEDICINES  Are you taking:   Steroids, prednisone, anti-cancer drugs, or medicines that suppress your immune system? No  Antibiotics or sulfonamides? No  Oral contraceptives (birth control pills)? No  Aspirin therapy (children and teens)? Yes    ALLERGIES  Are you allergic to:  Any medicines? Yes  Any foods or other? No  Neomycin, formalin, or fish products? No      Past Medical History:   Diagnosis Date    Annular pancreas     Congenital aortic stenosis     mild  bicuspid aortic valve    Epididymitis, bilateral     Gastroesophageal reflux disease without esophagitis     rare    Scoliosis     kyphosis, prior bracing       Immunization History   Administered Date(s) Administered    COVID-19 12+ (Pfizer) 11/15/2023    COVID-19 Bivalent 12+ (Pfizer) 11/04/2022    COVID-19 MONOVALENT 12+ (Pfizer) 03/11/2021, 04/01/2021, 11/05/2021    COVID-19 Monovalent 12+ (Pfizer 2022) 05/06/2022    Flu, Unspecified 10/22/2022    HEPA 08/11/2005, 07/17/2012    Influenza (IIV3) PF 09/19/2009, 09/19/2012, 09/21/2013, 10/15/2015    Influenza Vaccine 18-64 (Flublok) 10/23/2021, 10/22/2022, 12/03/2023    Influenza Vaccine >6 months,quad, PF 10/17/2020    Influenza Vaccine Trivalent (FluBlok) 10/19/2024    Influenza Vaccine, 6+MO IM (QUADRIVALENT W/PRESERVATIVES) 12/22/2016, 10/21/2017, 10/12/2019    Pneumococcal 20 valent Conjugate (Prevnar 20) 10/09/2024    TD,PF 7+ (Tenivac) 08/11/2005    TDAP Vaccine (Adacel) 03/03/2016    Varicella (Varivax) 07/07/1999, 10/12/1999, 12/13/1999       Current Outpatient Medications   Medication Sig Dispense Refill     aspirin 81 MG EC tablet Take 1 tablet (81 mg) by mouth daily.      calcium carbonate (OS-RICHMOND 500 MG [place]. [place]) 500 MG tablet Take 500 mg by mouth 2 times daily. Calcium Citrate      famotidine (PEPCID) 20 MG tablet Take 20 mg by mouth 2 times daily as needed      lisinopril (ZESTRIL) 2.5 MG tablet Take 1 tablet (2.5 mg) by mouth daily. 90 tablet 3    multivitamin, therapeutic with minerals (THERA-VIT-M) TABS Take 1 tablet by mouth daily.      acetaminophen (TYLENOL) 500 MG tablet Take 500-1,000 mg by mouth every 6 hours as needed for mild pain. (Patient not taking: Reported on 5/27/2025)      cyclobenzaprine (FLEXERIL) 5 MG tablet Take 1 tablet (5 mg) by mouth 3 times daily as needed for muscle spasms. 21 tablet 0    naproxen (NAPROSYN) 500 MG tablet Take 1 tablet (500 mg) by mouth 2 times daily (with meals). 20 tablet 0    triamcinolone (KENALOG) 0.1 % external cream Apply topically 2 times daily. To rash (Patient not taking: Reported on 5/27/2025) 45 g 2     Allergies   Allergen Reactions    Sulfa Antibiotics     Duricef [Cefadroxil Monohydrate] Rash     Rash          EXAM: deferred    Immunizations discussed include: Measles/Mumps/Rubella  Malaria prophylaxis recommended: not needed  Symptomatic treatment for traveler's diarrhea: bismuth subsalicylate, loperamide, and azithromycin    ASSESSMENT/PLAN:    (Z71.84) Encounter for counseling for travel  (primary encounter diagnosis)    Comment: MMR and covid vaccines today. Patient will return or follow-up with PCP as needed. Prophylaxis given for Traveler's diarrhea and is not needed for Malaria. All questions were answered. Patient was informed that vaccines may not be covered and should check with insurance company to be sure. They have decided to proceed with vaccines ordered today.    Plan:     (Z23) Need for COVID-19 vaccine  Comment:   Plan: COVID-19 12+ (PFIZER)            (Z23) Need for MMR vaccine  Comment:   Plan: MMR (M-M-R II)              I have reviewed  general recommendations for safe travel   including: food/water precautions, insect avoidance, safe sex   practices given high prevalence of HIV and other STDs,   roadway safety. Educational materials and links to the CDC   Traveler's health website have been provided.    Total time 18 minutes, greater than 50 percent in counseling   and coordination of care.

## 2025-08-17 ENCOUNTER — APPOINTMENT (OUTPATIENT)
Dept: GENERAL RADIOLOGY | Facility: CLINIC | Age: 65
End: 2025-08-17
Attending: EMERGENCY MEDICINE
Payer: COMMERCIAL

## 2025-08-17 ENCOUNTER — HOSPITAL ENCOUNTER (EMERGENCY)
Facility: CLINIC | Age: 65
Discharge: HOME | End: 2025-08-17
Attending: EMERGENCY MEDICINE
Payer: COMMERCIAL

## 2025-08-17 VITALS
WEIGHT: 158.07 LBS | TEMPERATURE: 98.3 F | DIASTOLIC BLOOD PRESSURE: 70 MMHG | BODY MASS INDEX: 25.51 KG/M2 | OXYGEN SATURATION: 97 % | SYSTOLIC BLOOD PRESSURE: 102 MMHG | HEART RATE: 71 BPM | RESPIRATION RATE: 18 BRPM

## 2025-08-17 DIAGNOSIS — R07.89 ATYPICAL CHEST PAIN: Primary | ICD-10-CM

## 2025-08-17 LAB
ALBUMIN SERPL BCG-MCNC: 4.1 G/DL (ref 3.5–5.2)
ALP SERPL-CCNC: 74 U/L (ref 40–150)
ALT SERPL W P-5'-P-CCNC: 18 U/L (ref 0–70)
ANION GAP SERPL CALCULATED.3IONS-SCNC: 11 MMOL/L (ref 7–15)
AST SERPL W P-5'-P-CCNC: 17 U/L (ref 0–45)
BASOPHILS # BLD AUTO: 0.04 10E3/UL (ref 0–0.2)
BASOPHILS NFR BLD AUTO: 0.4 %
BILIRUB DIRECT SERPL-MCNC: 0.15 MG/DL (ref 0–0.3)
BILIRUB SERPL-MCNC: 0.5 MG/DL
BUN SERPL-MCNC: 15.6 MG/DL (ref 8–23)
CALCIUM SERPL-MCNC: 9.1 MG/DL (ref 8.8–10.4)
CHLORIDE SERPL-SCNC: 104 MMOL/L (ref 98–107)
CREAT SERPL-MCNC: 0.94 MG/DL (ref 0.67–1.17)
D DIMER PPP FEU-MCNC: 0.33 UG/ML FEU (ref 0–0.5)
EGFRCR SERPLBLD CKD-EPI 2021: >90 ML/MIN/1.73M2
EOSINOPHIL # BLD AUTO: 0.11 10E3/UL (ref 0–0.7)
EOSINOPHIL NFR BLD AUTO: 1.2 %
ERYTHROCYTE [DISTWIDTH] IN BLOOD BY AUTOMATED COUNT: 13 % (ref 10–15)
GLUCOSE SERPL-MCNC: 129 MG/DL (ref 70–99)
HCO3 SERPL-SCNC: 22 MMOL/L (ref 22–29)
HCT VFR BLD AUTO: 35.4 % (ref 40–53)
HGB BLD-MCNC: 11.9 G/DL (ref 13.3–17.7)
HOLD SPECIMEN: NORMAL
HOLD SPECIMEN: NORMAL
IMM GRANULOCYTES # BLD: <0.03 10E3/UL
IMM GRANULOCYTES NFR BLD: 0.2 %
LIPASE SERPL-CCNC: 49 U/L (ref 13–60)
LYMPHOCYTES # BLD AUTO: 0.82 10E3/UL (ref 0.8–5.3)
LYMPHOCYTES NFR BLD AUTO: 8.8 %
MCH RBC QN AUTO: 32 PG (ref 26.5–33)
MCHC RBC AUTO-ENTMCNC: 33.6 G/DL (ref 31.5–36.5)
MCV RBC AUTO: 95.2 FL (ref 78–100)
MONOCYTES # BLD AUTO: 1.09 10E3/UL (ref 0–1.3)
MONOCYTES NFR BLD AUTO: 11.7 %
NEUTROPHILS # BLD AUTO: 7.25 10E3/UL (ref 1.6–8.3)
NEUTROPHILS NFR BLD AUTO: 77.7 %
NRBC # BLD AUTO: <0.03 10E3/UL
NRBC BLD AUTO-RTO: 0 /100
PLATELET # BLD AUTO: 229 10E3/UL (ref 150–450)
POTASSIUM SERPL-SCNC: 3.8 MMOL/L (ref 3.4–5.3)
PROT SERPL-MCNC: 6.9 G/DL (ref 6.4–8.3)
RBC # BLD AUTO: 3.72 10E6/UL (ref 4.4–5.9)
SODIUM SERPL-SCNC: 137 MMOL/L (ref 135–145)
TROPONIN T SERPL HS-MCNC: 7 NG/L
TROPONIN T SERPL HS-MCNC: 8 NG/L
WBC # BLD AUTO: 9.33 10E3/UL (ref 4–11)

## 2025-08-17 PROCEDURE — 36415 COLL VENOUS BLD VENIPUNCTURE: CPT | Performed by: EMERGENCY MEDICINE

## 2025-08-17 PROCEDURE — 82248 BILIRUBIN DIRECT: CPT | Performed by: EMERGENCY MEDICINE

## 2025-08-17 PROCEDURE — 84484 ASSAY OF TROPONIN QUANT: CPT | Performed by: EMERGENCY MEDICINE

## 2025-08-17 PROCEDURE — 85025 COMPLETE CBC W/AUTO DIFF WBC: CPT | Performed by: EMERGENCY MEDICINE

## 2025-08-17 PROCEDURE — 83690 ASSAY OF LIPASE: CPT | Performed by: EMERGENCY MEDICINE

## 2025-08-17 PROCEDURE — 93005 ELECTROCARDIOGRAM TRACING: CPT

## 2025-08-17 PROCEDURE — 71046 X-RAY EXAM CHEST 2 VIEWS: CPT

## 2025-08-17 PROCEDURE — 80048 BASIC METABOLIC PNL TOTAL CA: CPT | Performed by: EMERGENCY MEDICINE

## 2025-08-17 PROCEDURE — 85379 FIBRIN DEGRADATION QUANT: CPT | Performed by: EMERGENCY MEDICINE

## 2025-08-17 PROCEDURE — 99285 EMERGENCY DEPT VISIT HI MDM: CPT | Mod: 25 | Performed by: EMERGENCY MEDICINE

## 2025-08-17 ASSESSMENT — COLUMBIA-SUICIDE SEVERITY RATING SCALE - C-SSRS
1. IN THE PAST MONTH, HAVE YOU WISHED YOU WERE DEAD OR WISHED YOU COULD GO TO SLEEP AND NOT WAKE UP?: NO
2. HAVE YOU ACTUALLY HAD ANY THOUGHTS OF KILLING YOURSELF IN THE PAST MONTH?: NO
6. HAVE YOU EVER DONE ANYTHING, STARTED TO DO ANYTHING, OR PREPARED TO DO ANYTHING TO END YOUR LIFE?: NO

## 2025-08-17 ASSESSMENT — ACTIVITIES OF DAILY LIVING (ADL)
ADLS_ACUITY_SCORE: 41

## 2025-08-18 LAB
ATRIAL RATE - MUSE: 84 BPM
DIASTOLIC BLOOD PRESSURE - MUSE: NORMAL MMHG
INTERPRETATION ECG - MUSE: NORMAL
P AXIS - MUSE: 45 DEGREES
PR INTERVAL - MUSE: 166 MS
QRS DURATION - MUSE: 76 MS
QT - MUSE: 342 MS
QTC - MUSE: 404 MS
R AXIS - MUSE: 25 DEGREES
SYSTOLIC BLOOD PRESSURE - MUSE: NORMAL MMHG
T AXIS - MUSE: 38 DEGREES
VENTRICULAR RATE- MUSE: 84 BPM